# Patient Record
Sex: FEMALE | Race: WHITE | Employment: UNEMPLOYED | URBAN - METROPOLITAN AREA
[De-identification: names, ages, dates, MRNs, and addresses within clinical notes are randomized per-mention and may not be internally consistent; named-entity substitution may affect disease eponyms.]

---

## 2021-02-07 ENCOUNTER — HOSPITAL ENCOUNTER (EMERGENCY)
Age: 46
Discharge: HOME | End: 2021-02-07
Attending: EMERGENCY MEDICINE

## 2021-02-07 VITALS
DIASTOLIC BLOOD PRESSURE: 78 MMHG | HEIGHT: 65 IN | HEART RATE: 103 BPM | BODY MASS INDEX: 36.65 KG/M2 | WEIGHT: 220 LBS | SYSTOLIC BLOOD PRESSURE: 123 MMHG | TEMPERATURE: 98.7 F | RESPIRATION RATE: 18 BRPM | OXYGEN SATURATION: 96 %

## 2021-02-07 DIAGNOSIS — E87.6 HYPOKALEMIA: ICD-10-CM

## 2021-02-07 DIAGNOSIS — F15.10 METHAMPHETAMINE ABUSE (HCC): Primary | ICD-10-CM

## 2021-02-07 LAB
ALBUMIN SERPL-MCNC: 3.1 G/DL (ref 3.5–5)
ALBUMIN/GLOB SERPL: 0.7 {RATIO} (ref 1.2–3.5)
ALP SERPL-CCNC: 67 U/L (ref 50–136)
ALT SERPL-CCNC: 135 U/L (ref 12–65)
AMPHET UR QL SCN: POSITIVE
ANION GAP SERPL CALC-SCNC: 10 MMOL/L (ref 7–16)
APPEARANCE UR: ABNORMAL
AST SERPL-CCNC: 170 U/L (ref 15–37)
BACTERIA URNS QL MICRO: ABNORMAL /HPF
BARBITURATES UR QL SCN: NEGATIVE
BASOPHILS # BLD: 0 K/UL (ref 0–0.2)
BASOPHILS NFR BLD: 0 % (ref 0–2)
BENZODIAZ UR QL: NEGATIVE
BILIRUB SERPL-MCNC: 0.7 MG/DL (ref 0.2–1.1)
BILIRUB UR QL: ABNORMAL
BUN SERPL-MCNC: 17 MG/DL (ref 6–23)
CALCIUM SERPL-MCNC: 8.3 MG/DL (ref 8.3–10.4)
CANNABINOIDS UR QL SCN: POSITIVE
CASTS URNS QL MICRO: ABNORMAL /LPF
CHLORIDE SERPL-SCNC: 99 MMOL/L (ref 98–107)
CO2 SERPL-SCNC: 28 MMOL/L (ref 21–32)
COCAINE UR QL SCN: NEGATIVE
COLOR UR: ABNORMAL
CREAT SERPL-MCNC: 0.85 MG/DL (ref 0.6–1)
DIFFERENTIAL METHOD BLD: ABNORMAL
EOSINOPHIL # BLD: 0.1 K/UL (ref 0–0.8)
EOSINOPHIL NFR BLD: 1 % (ref 0.5–7.8)
EPI CELLS #/AREA URNS HPF: ABNORMAL /HPF
ERYTHROCYTE [DISTWIDTH] IN BLOOD BY AUTOMATED COUNT: 12.7 % (ref 11.9–14.6)
GLOBULIN SER CALC-MCNC: 4.2 G/DL (ref 2.3–3.5)
GLUCOSE SERPL-MCNC: 135 MG/DL (ref 65–100)
GLUCOSE UR STRIP.AUTO-MCNC: NEGATIVE MG/DL
HCG UR QL: NEGATIVE
HCT VFR BLD AUTO: 34.8 % (ref 35.8–46.3)
HGB BLD-MCNC: 12.2 G/DL (ref 11.7–15.4)
HGB UR QL STRIP: ABNORMAL
IMM GRANULOCYTES # BLD AUTO: 0 K/UL (ref 0–0.5)
IMM GRANULOCYTES NFR BLD AUTO: 0 % (ref 0–5)
KETONES UR QL STRIP.AUTO: ABNORMAL MG/DL
LACTATE SERPL-SCNC: 2 MMOL/L (ref 0.4–2)
LEUKOCYTE ESTERASE UR QL STRIP.AUTO: ABNORMAL
LYMPHOCYTES # BLD: 0.7 K/UL (ref 0.5–4.6)
LYMPHOCYTES NFR BLD: 7 % (ref 13–44)
MCH RBC QN AUTO: 30.3 PG (ref 26.1–32.9)
MCHC RBC AUTO-ENTMCNC: 35.1 G/DL (ref 31.4–35)
MCV RBC AUTO: 86.4 FL (ref 79.6–97.8)
METHADONE UR QL: NEGATIVE
MONOCYTES # BLD: 0.8 K/UL (ref 0.1–1.3)
MONOCYTES NFR BLD: 8 % (ref 4–12)
MUCOUS THREADS URNS QL MICRO: ABNORMAL /LPF
NEUTS SEG # BLD: 8.2 K/UL (ref 1.7–8.2)
NEUTS SEG NFR BLD: 84 % (ref 43–78)
NITRITE UR QL STRIP.AUTO: NEGATIVE
NRBC # BLD: 0 K/UL (ref 0–0.2)
OPIATES UR QL: NEGATIVE
OTHER OBSERVATIONS,UCOM: ABNORMAL
PCP UR QL: NEGATIVE
PH UR STRIP: 6 [PH] (ref 5–9)
PLATELET # BLD AUTO: 172 K/UL (ref 150–450)
PMV BLD AUTO: 10.6 FL (ref 9.4–12.3)
POTASSIUM SERPL-SCNC: 2.7 MMOL/L (ref 3.5–5.1)
PROT SERPL-MCNC: 7.3 G/DL (ref 6.3–8.2)
PROT UR STRIP-MCNC: 30 MG/DL
RBC # BLD AUTO: 4.03 M/UL (ref 4.05–5.2)
RBC #/AREA URNS HPF: ABNORMAL /HPF
SODIUM SERPL-SCNC: 137 MMOL/L (ref 136–145)
SP GR UR REFRACTOMETRY: 1.03 (ref 1–1.02)
UROBILINOGEN UR QL STRIP.AUTO: 1 EU/DL (ref 0.2–1)
WBC # BLD AUTO: 9.8 K/UL (ref 4.3–11.1)
WBC URNS QL MICRO: ABNORMAL /HPF

## 2021-02-07 PROCEDURE — 81001 URINALYSIS AUTO W/SCOPE: CPT

## 2021-02-07 PROCEDURE — 99285 EMERGENCY DEPT VISIT HI MDM: CPT

## 2021-02-07 PROCEDURE — 74011000250 HC RX REV CODE- 250: Performed by: EMERGENCY MEDICINE

## 2021-02-07 PROCEDURE — 83605 ASSAY OF LACTIC ACID: CPT

## 2021-02-07 PROCEDURE — 80307 DRUG TEST PRSMV CHEM ANLYZR: CPT

## 2021-02-07 PROCEDURE — 74011250637 HC RX REV CODE- 250/637: Performed by: EMERGENCY MEDICINE

## 2021-02-07 PROCEDURE — 85025 COMPLETE CBC W/AUTO DIFF WBC: CPT

## 2021-02-07 PROCEDURE — 81025 URINE PREGNANCY TEST: CPT

## 2021-02-07 PROCEDURE — 80053 COMPREHEN METABOLIC PANEL: CPT

## 2021-02-07 RX ORDER — POTASSIUM CHLORIDE 20 MEQ/1
40 TABLET, EXTENDED RELEASE ORAL
Status: COMPLETED | OUTPATIENT
Start: 2021-02-07 | End: 2021-02-07

## 2021-02-07 RX ADMIN — POTASSIUM CHLORIDE 40 MEQ: 20 TABLET, EXTENDED RELEASE ORAL at 06:37

## 2021-02-07 RX ADMIN — BACITRACIN, NEOMYCIN, POLYMYXIN B 1 PACKET: 400; 3.5; 5 OINTMENT TOPICAL at 04:41
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
## 2021-02-07 NOTE — ED NOTES
I have reviewed discharge instructions with the patient. The patient verbalized understanding. Patient left ED via Discharge Method: ambulatory to Home with self with resources from [facility]  [facility]. Given Eveline duran to [facility] of pts request.     Opportunity for questions and clarification provided. Patient given 0 scripts. To continue your aftercare when you leave the hospital, you may receive an automated call from our care team to check in on how you are doing. This is a free service and part of our promise to provide the best care and service to meet your aftercare needs.  If you have questions, or wish to unsubscribe from this service please call [phone]. Thank you for Choosing our [employer] Emergency Department.

## 2021-02-07 NOTE — ED NOTES
Pt I no acute distress at this time. Ambulated to and from restroom with no difficulties. Awaiting Social work and [facility] was called and a message left.
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
## 2021-02-07 NOTE — ED PROVIDER NOTES
75-year-old female presenting for evaluation of substance abuse issues. Reports that she started using methamphetamines again and needs assistance establishing some stability so she can get off the streets get away from these things. She was just released from skilled nursing on Monday but has nowhere to stay. She also wants to know where these lesions on her skin are coming from. She does admit that she injects drugs. She denies smoking drugs or snorting drugs. She only uses methamphetamines. She tells me part of her reason for using them again was to [facility] awake\" because while she was out on the street she knew she could not trust anyone. The history is provided by the patient. Skin Problem   This is a new problem. The current episode started more than 1 week ago. The problem has not changed since onset. Associated with: IV drug abuse. There has been no fever. The rash is present on the scalp, head, face, lips, right hand and left arm. The pain is at a severity of 3/10. The pain is mild. The pain has been constant since onset. Associated symptoms include blisters, pain and weeping. She has tried nothing for the symptoms. The treatment provided no relief. Past Medical History:   Diagnosis Date    Other ill-defined conditions(459.25)     sciatica; high cholesterol       Past Surgical History:   Procedure Laterality Date    HX  SECTION      x 4    HX CHOLECYSTECTOMY           No family history on file.     Social History     Socioeconomic History    Marital status:      Spouse name: Not on file    Number of children: Not on file    Years of education: Not on file    Highest education level: Not on file   Occupational History    Not on file   Social Needs    Financial resource strain: Not on file    Food insecurity     Worry: Not on file     Inability: Not on file    Transportation needs     Medical: Not on file     Non-medical: Not on file   Tobacco Use    Smoking status: Current Every Day Smoker     Packs/day: 1.00   Substance and Sexual Activity    Alcohol use: Yes     Comment: socially    Drug use: No    Sexual activity: Yes     Partners: Male   Lifestyle    Physical activity     Days per week: Not on file     Minutes per session: Not on file    Stress: Not on file   Relationships    Social connections     Talks on phone: Not on file     Gets together: Not on file     Attends Yazidism service: Not on file     Active member of club or organization: Not on file     Attends meetings of clubs or organizations: Not on file     Relationship status: Not on file    Intimate partner violence     Fear of current or ex partner: Not on file     Emotionally abused: Not on file     Physically abused: Not on file     Forced sexual activity: Not on file   Other Topics Concern    Not on file   Social History Narrative    Not on file         ALLERGIES: Erythromycin    Review of Systems   Constitutional: Positive for activity change. Skin: Positive for wound. Psychiatric/Behavioral: Positive for agitation, behavioral problems and sleep disturbance. The patient is nervous/anxious and is hyperactive. All other systems reviewed and are negative. Vitals:    02/07/21 0424   BP: (!) 172/127   Pulse: (!) 106   Resp: 18   Temp: 98.7 °F (37.1 °C)   SpO2: 100%   Weight: 99.8 kg (220 lb)   Height: 5' 5\" (1.651 m)            Physical Exam  Vitals signs and nursing note reviewed. Constitutional:       Appearance: She is well-developed. HENT:      Head: Normocephalic and atraumatic. Nose: Nose normal.      Mouth/Throat:      Comments: Diffuse, scaly, thickened and irritated appearing surface of the lips oral mucosa and buccal mucosa are intact and normal.  Eyes:      Conjunctiva/sclera: Conjunctivae normal.      Pupils: Pupils are equal, round, and reactive to light. Neck:      Musculoskeletal: Neck supple. Cardiovascular:      Rate and Rhythm: Normal rate and regular rhythm. Pulmonary:      Effort: Pulmonary effort is normal.      Breath sounds: Normal breath sounds. Abdominal:      General: Bowel sounds are normal.      Palpations: Abdomen is soft. Musculoskeletal: Normal range of motion. Skin:     General: Skin is warm and dry. Comments: Multiple track marks along both forearms, multiple small subcutaneous blisters and pustules on bilateral hands, left forearm, ruptured blisters on the bottoms of both feet   Neurological:      Mental Status: She is alert and oriented to person, place, and time. MDM  Number of Diagnoses or Management Options  Diagnosis management comments: 35-year-old female presenting for evaluation of skin lesions and concerns about her resumption of methamphetamine abuse.        Amount and/or Complexity of Data Reviewed  Clinical lab tests: ordered and reviewed (Results for orders placed or performed during the hospital encounter of 02/07/21  -CBC WITH AUTOMATED DIFF       Result                      Value             Ref Range           WBC                         9.8               4.3 - 11.1 K*       RBC                         4.03 (L)          4.05 - 5.2 M*       HGB                         12.2              11.7 - 15.4 *       HCT                         34.8 (L)          35.8 - 46.3 %       MCV                         86.4              79.6 - 97.8 *       MCH                         30.3              26.1 - 32.9 *       MCHC                        35.1 (H)          31.4 - 35.0 *       RDW                         12.7              11.9 - 14.6 %       PLATELET                    172               150 - 450 K/*       MPV                         10.6              9.4 - 12.3 FL       ABSOLUTE NRBC               0.00              0.0 - 0.2 K/*       DF                          AUTOMATED                             NEUTROPHILS                 84 (H)            43 - 78 %           LYMPHOCYTES                 7 (L)             13 - 44 % MONOCYTES                   8                 4.0 - 12.0 %        EOSINOPHILS                 1                 0.5 - 7.8 %         BASOPHILS                   0                 0.0 - 2.0 %         IMMATURE GRANULOCYTES       0                 0.0 - 5.0 %         ABS. NEUTROPHILS            8.2               1.7 - 8.2 K/*       ABS. LYMPHOCYTES            0.7               0.5 - 4.6 K/*       ABS. MONOCYTES              0.8               0.1 - 1.3 K/*       ABS. EOSINOPHILS            0.1               0.0 - 0.8 K/*       ABS. BASOPHILS              0.0               0.0 - 0.2 K/*       ABS. IMM. GRANS.            0.0               0.0 - 0.5 K/*  -METABOLIC PANEL, COMPREHENSIVE       Result                      Value             Ref Range           Sodium                      137               136 - 145 mm*       Potassium                   2.7 (L)           3.5 - 5.1 mm*       Chloride                    99                98 - 107 mmo*       CO2                         28                21 - 32 mmol*       Anion gap                   10                7 - 16 mmol/L       Glucose                     135 (H)           65 - 100 mg/*       BUN                         17                6 - 23 MG/DL        Creatinine                  0.85              0.6 - 1.0 MG*       GFR est AA                  >60               >60 ml/min/1*       GFR est non-AA              >60               >60 ml/min/1*       Calcium                     8.3               8.3 - 10.4 M*       Bilirubin, total            0.7               0.2 - 1.1 MG*       ALT (SGPT)                  135 (H)           12 - 65 U/L         AST (SGOT)                  170 (H)           15 - 37 U/L         Alk.  phosphatase            67                50 - 136 U/L        Protein, total              7.3               6.3 - 8.2 g/*       Albumin                     3.1 (L)           3.5 - 5.0 g/*       Globulin                    4.2 (H)           2.3 - 3.5 g/* A-G Ratio                   0.7 (L)           1.2 - 3.5      -LACTIC ACID       Result                      Value             Ref Range           Lactic acid                 2.0               0.4 - 2.0 MM*  )  Decide to obtain previous medical records or to obtain history from someone other than the patient: yes    Risk of Complications, Morbidity, and/or Mortality  Presenting problems: high  Diagnostic procedures: moderate  Management options: moderate  General comments: Patient is remained hemodynamically stable. Potassium is low and repleted orally. Applied antibiotic ointment to the patient's chapped and cracked lips. Other wounds on hands and feet do not appear to be infected will likely improve if the patient stops using IV drugs. Placed a consult order for social work evaluation.     Patient Progress  Patient progress: stable         Procedures

## 2021-02-08 ENCOUNTER — APPOINTMENT (OUTPATIENT)
Dept: CT IMAGING | Age: 46
DRG: 871 | End: 2021-02-08
Attending: EMERGENCY MEDICINE

## 2021-02-08 ENCOUNTER — HOSPITAL ENCOUNTER (INPATIENT)
Age: 46
LOS: 7 days | Discharge: HOME | DRG: 871 | End: 2021-02-15
Attending: EMERGENCY MEDICINE | Admitting: HOSPITALIST

## 2021-02-08 DIAGNOSIS — L03.90 CELLULITIS, UNSPECIFIED CELLULITIS SITE: ICD-10-CM

## 2021-02-08 DIAGNOSIS — N30.00 ACUTE CYSTITIS WITHOUT HEMATURIA: ICD-10-CM

## 2021-02-08 DIAGNOSIS — L03.818 CELLULITIS OF OTHER SPECIFIED SITE: ICD-10-CM

## 2021-02-08 DIAGNOSIS — F15.10 METHAMPHETAMINE ABUSE (HCC): Primary | ICD-10-CM

## 2021-02-08 DIAGNOSIS — T14.8XXA SKIN ABRASION: ICD-10-CM

## 2021-02-08 PROBLEM — A41.9 SEPSIS (HCC): Status: ACTIVE | Noted: 2021-02-08

## 2021-02-08 PROBLEM — R41.82 ALTERED MENTAL STATUS: Status: ACTIVE | Noted: 2021-02-08

## 2021-02-08 LAB
ALBUMIN SERPL-MCNC: 3.3 G/DL (ref 3.5–5)
ALBUMIN/GLOB SERPL: 0.8 {RATIO} (ref 1.2–3.5)
ALP SERPL-CCNC: 76 U/L (ref 50–136)
ALT SERPL-CCNC: 117 U/L (ref 12–65)
ANION GAP SERPL CALC-SCNC: 8 MMOL/L (ref 7–16)
AST SERPL-CCNC: 116 U/L (ref 15–37)
BASOPHILS # BLD: 0 K/UL (ref 0–0.2)
BASOPHILS NFR BLD: 0 % (ref 0–2)
BILIRUB SERPL-MCNC: 1.1 MG/DL (ref 0.2–1.1)
BUN SERPL-MCNC: 15 MG/DL (ref 6–23)
CALCIUM SERPL-MCNC: 8 MG/DL (ref 8.3–10.4)
CHLORIDE SERPL-SCNC: 92 MMOL/L (ref 98–107)
CO2 SERPL-SCNC: 29 MMOL/L (ref 21–32)
CREAT SERPL-MCNC: 0.73 MG/DL (ref 0.6–1)
CRP SERPL-MCNC: 17.8 MG/DL (ref 0–0.9)
DIFFERENTIAL METHOD BLD: ABNORMAL
EOSINOPHIL # BLD: 0.1 K/UL (ref 0–0.8)
EOSINOPHIL NFR BLD: 1 % (ref 0.5–7.8)
ERYTHROCYTE [DISTWIDTH] IN BLOOD BY AUTOMATED COUNT: 12.7 % (ref 11.9–14.6)
GLOBULIN SER CALC-MCNC: 3.9 G/DL (ref 2.3–3.5)
GLUCOSE SERPL-MCNC: 106 MG/DL (ref 65–100)
HCT VFR BLD AUTO: 33.6 % (ref 35.8–46.3)
HGB BLD-MCNC: 11.8 G/DL (ref 11.7–15.4)
IMM GRANULOCYTES # BLD AUTO: 0.1 K/UL (ref 0–0.5)
IMM GRANULOCYTES NFR BLD AUTO: 1 % (ref 0–5)
LACTATE SERPL-SCNC: 1.7 MMOL/L (ref 0.4–2)
LYMPHOCYTES # BLD: 0.8 K/UL (ref 0.5–4.6)
LYMPHOCYTES NFR BLD: 8 % (ref 13–44)
MAGNESIUM SERPL-MCNC: 2 MG/DL (ref 1.8–2.4)
MCH RBC QN AUTO: 29.8 PG (ref 26.1–32.9)
MCHC RBC AUTO-ENTMCNC: 35.1 G/DL (ref 31.4–35)
MCV RBC AUTO: 84.8 FL (ref 79.6–97.8)
MONOCYTES # BLD: 0.9 K/UL (ref 0.1–1.3)
MONOCYTES NFR BLD: 8 % (ref 4–12)
NEUTS SEG # BLD: 9.3 K/UL (ref 1.7–8.2)
NEUTS SEG NFR BLD: 84 % (ref 43–78)
NRBC # BLD: 0 K/UL (ref 0–0.2)
PLATELET # BLD AUTO: 224 K/UL (ref 150–450)
PMV BLD AUTO: 10.6 FL (ref 9.4–12.3)
POTASSIUM SERPL-SCNC: 2.7 MMOL/L (ref 3.5–5.1)
PROCALCITONIN SERPL-MCNC: 0.25 NG/ML
PROT SERPL-MCNC: 7.2 G/DL (ref 6.3–8.2)
RBC # BLD AUTO: 3.96 M/UL (ref 4.05–5.2)
SODIUM SERPL-SCNC: 129 MMOL/L (ref 136–145)
WBC # BLD AUTO: 11.1 K/UL (ref 4.3–11.1)

## 2021-02-08 PROCEDURE — 74011250636 HC RX REV CODE- 250/636: Performed by: EMERGENCY MEDICINE

## 2021-02-08 PROCEDURE — 96365 THER/PROPH/DIAG IV INF INIT: CPT

## 2021-02-08 PROCEDURE — 87077 CULTURE AEROBIC IDENTIFY: CPT

## 2021-02-08 PROCEDURE — 83735 ASSAY OF MAGNESIUM: CPT

## 2021-02-08 PROCEDURE — 70450 CT HEAD/BRAIN W/O DYE: CPT

## 2021-02-08 PROCEDURE — 36415 COLL VENOUS BLD VENIPUNCTURE: CPT

## 2021-02-08 PROCEDURE — 96375 TX/PRO/DX INJ NEW DRUG ADDON: CPT

## 2021-02-08 PROCEDURE — 85025 COMPLETE CBC W/AUTO DIFF WBC: CPT

## 2021-02-08 PROCEDURE — 80053 COMPREHEN METABOLIC PANEL: CPT

## 2021-02-08 PROCEDURE — 80074 ACUTE HEPATITIS PANEL: CPT

## 2021-02-08 PROCEDURE — 87389 HIV-1 AG W/HIV-1&-2 AB AG IA: CPT

## 2021-02-08 PROCEDURE — 96367 TX/PROPH/DG ADDL SEQ IV INF: CPT

## 2021-02-08 PROCEDURE — 87150 DNA/RNA AMPLIFIED PROBE: CPT

## 2021-02-08 PROCEDURE — 84145 PROCALCITONIN (PCT): CPT

## 2021-02-08 PROCEDURE — 87186 SC STD MICRODIL/AGAR DIL: CPT

## 2021-02-08 PROCEDURE — 99284 EMERGENCY DEPT VISIT MOD MDM: CPT

## 2021-02-08 PROCEDURE — 83605 ASSAY OF LACTIC ACID: CPT

## 2021-02-08 PROCEDURE — 74011250637 HC RX REV CODE- 250/637: Performed by: EMERGENCY MEDICINE

## 2021-02-08 PROCEDURE — 86140 C-REACTIVE PROTEIN: CPT

## 2021-02-08 PROCEDURE — 65270000029 HC RM PRIVATE

## 2021-02-08 PROCEDURE — 87040 BLOOD CULTURE FOR BACTERIA: CPT

## 2021-02-08 PROCEDURE — 74011000258 HC RX REV CODE- 258: Performed by: EMERGENCY MEDICINE

## 2021-02-08 PROCEDURE — 87205 SMEAR GRAM STAIN: CPT

## 2021-02-08 PROCEDURE — 96361 HYDRATE IV INFUSION ADD-ON: CPT

## 2021-02-08 PROCEDURE — 74011250636 HC RX REV CODE- 250/636: Performed by: HOSPITALIST

## 2021-02-08 RX ORDER — POTASSIUM CHLORIDE 14.9 MG/ML
20 INJECTION INTRAVENOUS
Status: COMPLETED | OUTPATIENT
Start: 2021-02-08 | End: 2021-02-09

## 2021-02-08 RX ORDER — ACETAMINOPHEN 650 MG/1
650 SUPPOSITORY RECTAL
Status: DISCONTINUED | OUTPATIENT
Start: 2021-02-08 | End: 2021-02-15 | Stop reason: HOSPADM

## 2021-02-08 RX ORDER — CEPHALEXIN 500 MG/1
500 CAPSULE ORAL 2 TIMES DAILY
Qty: 14 CAP | Refills: 0 | Status: SHIPPED | OUTPATIENT
Start: 2021-02-08 | End: 2021-02-14

## 2021-02-08 RX ORDER — POTASSIUM CHLORIDE 20 MEQ/1
40 TABLET, EXTENDED RELEASE ORAL
Status: ACTIVE | OUTPATIENT
Start: 2021-02-08 | End: 2021-02-09

## 2021-02-08 RX ORDER — ONDANSETRON 2 MG/ML
4 INJECTION INTRAMUSCULAR; INTRAVENOUS
Status: DISCONTINUED | OUTPATIENT
Start: 2021-02-08 | End: 2021-02-15 | Stop reason: HOSPADM

## 2021-02-08 RX ORDER — LORAZEPAM 2 MG/ML
2 INJECTION INTRAMUSCULAR
Status: COMPLETED | OUTPATIENT
Start: 2021-02-08 | End: 2021-02-08

## 2021-02-08 RX ORDER — CLINDAMYCIN PHOSPHATE 900 MG/50ML
900 INJECTION INTRAVENOUS
Status: COMPLETED | OUTPATIENT
Start: 2021-02-08 | End: 2021-02-08

## 2021-02-08 RX ORDER — POLYETHYLENE GLYCOL 3350 17 G/17G
17 POWDER, FOR SOLUTION ORAL DAILY PRN
Status: DISCONTINUED | OUTPATIENT
Start: 2021-02-08 | End: 2021-02-15 | Stop reason: HOSPADM

## 2021-02-08 RX ORDER — SODIUM CHLORIDE 0.9 % (FLUSH) 0.9 %
5-40 SYRINGE (ML) INJECTION EVERY 8 HOURS
Status: DISCONTINUED | OUTPATIENT
Start: 2021-02-09 | End: 2021-02-15 | Stop reason: HOSPADM

## 2021-02-08 RX ORDER — HALOPERIDOL 5 MG/ML
4 INJECTION INTRAMUSCULAR ONCE
Status: COMPLETED | OUTPATIENT
Start: 2021-02-08 | End: 2021-02-08

## 2021-02-08 RX ORDER — SODIUM CHLORIDE 0.9 % (FLUSH) 0.9 %
5-40 SYRINGE (ML) INJECTION AS NEEDED
Status: DISCONTINUED | OUTPATIENT
Start: 2021-02-08 | End: 2021-02-15 | Stop reason: HOSPADM

## 2021-02-08 RX ORDER — ENOXAPARIN SODIUM 100 MG/ML
40 INJECTION SUBCUTANEOUS DAILY
Status: DISCONTINUED | OUTPATIENT
Start: 2021-02-09 | End: 2021-02-15 | Stop reason: HOSPADM

## 2021-02-08 RX ORDER — PROMETHAZINE HYDROCHLORIDE 25 MG/1
12.5 TABLET ORAL
Status: DISCONTINUED | OUTPATIENT
Start: 2021-02-08 | End: 2021-02-15 | Stop reason: HOSPADM

## 2021-02-08 RX ORDER — ACETAMINOPHEN 500 MG
1000 TABLET ORAL
Status: COMPLETED | OUTPATIENT
Start: 2021-02-08 | End: 2021-02-08

## 2021-02-08 RX ORDER — ACETAMINOPHEN 325 MG/1
650 TABLET ORAL
Status: DISCONTINUED | OUTPATIENT
Start: 2021-02-08 | End: 2021-02-15 | Stop reason: HOSPADM

## 2021-02-08 RX ORDER — ZOLPIDEM TARTRATE 5 MG/1
5 TABLET ORAL
Status: DISCONTINUED | OUTPATIENT
Start: 2021-02-08 | End: 2021-02-15 | Stop reason: HOSPADM

## 2021-02-08 RX ADMIN — SODIUM CHLORIDE 1000 ML: 900 INJECTION, SOLUTION INTRAVENOUS at 14:46

## 2021-02-08 RX ADMIN — CLINDAMYCIN IN 5 PERCENT DEXTROSE 900 MG: 18 INJECTION, SOLUTION INTRAVENOUS at 18:52

## 2021-02-08 RX ADMIN — CEFTRIAXONE SODIUM 1 G: 1 INJECTION, POWDER, FOR SOLUTION INTRAMUSCULAR; INTRAVENOUS at 15:51

## 2021-02-08 RX ADMIN — THIAMINE HYDROCHLORIDE: 100 INJECTION, SOLUTION INTRAMUSCULAR; INTRAVENOUS at 20:12

## 2021-02-08 RX ADMIN — HALOPERIDOL LACTATE 4 MG: 5 INJECTION, SOLUTION INTRAMUSCULAR at 17:00

## 2021-02-08 RX ADMIN — ACETAMINOPHEN 1000 MG: 500 TABLET ORAL at 14:46

## 2021-02-08 RX ADMIN — POTASSIUM CHLORIDE 20 MEQ: 14.9 INJECTION, SOLUTION INTRAVENOUS at 18:56

## 2021-02-08 RX ADMIN — LORAZEPAM 2 MG: 2 INJECTION INTRAMUSCULAR; INTRAVENOUS at 14:46

## 2021-02-08 NOTE — ED TRIAGE NOTES
[place] [facility] Pt has wounds all over her in various stages. Pt reports using Meth.  /73 Temp 101.5

## 2021-02-08 NOTE — ED PROVIDER NOTES
Patient presents to the ER via EMS for \"wounds all over her body\". Apparently patient was found [place] the gas station, acting altered, admitted to methamphetamine use. Reports she has wounds all over her body. The history is provided by the patient. Skin Problem   This is a new problem. The current episode started more than 2 days ago. The problem has not changed since onset. There has been a fever of 101 - 101.9 F. The patient is experiencing no pain. Associated symptoms include weeping. Past Medical History:   Diagnosis Date    Other ill-defined conditions(819.89)     sciatica; high cholesterol       Past Surgical History:   Procedure Laterality Date    HX  SECTION      x 4    HX CHOLECYSTECTOMY           No family history on file.     Social History     Socioeconomic History    Marital status:      Spouse name: Not on file    Number of children: Not on file    Years of education: Not on file    Highest education level: Not on file   Occupational History    Not on file   Social Needs    Financial resource strain: Not on file    Food insecurity     Worry: Not on file     Inability: Not on file    Transportation needs     Medical: Not on file     Non-medical: Not on file   Tobacco Use    Smoking status: Current Every Day Smoker     Packs/day: 1.00   Substance and Sexual Activity    Alcohol use: Yes     Comment: socially    Drug use: No    Sexual activity: Yes     Partners: Male   Lifestyle    Physical activity     Days per week: Not on file     Minutes per session: Not on file    Stress: Not on file   Relationships    Social connections     Talks on phone: Not on file     Gets together: Not on file     Attends Sabianism service: Not on file     Active member of club or organization: Not on file     Attends meetings of clubs or organizations: Not on file     Relationship status: Not on file    Intimate partner violence     Fear of current or ex partner: Not on file Emotionally abused: Not on file     Physically abused: Not on file     Forced sexual activity: Not on file   Other Topics Concern    Not on file   Social History Narrative    Not on file         ALLERGIES: Erythromycin    Review of Systems   Constitutional: Positive for fatigue and fever. HENT: Negative for congestion and dental problem. Eyes: Negative for redness and visual disturbance. Respiratory: Negative for choking, chest tightness, shortness of breath and stridor. Cardiovascular: Negative for palpitations and leg swelling. Gastrointestinal: Negative for abdominal pain, nausea and vomiting. Genitourinary: Negative for hematuria and urgency. Musculoskeletal: Negative for back pain, gait problem and neck stiffness. Skin: Negative for color change and pallor. Neurological: Negative for tremors, weakness, light-headedness and numbness. Hematological: Negative for adenopathy. Psychiatric/Behavioral: Negative for behavioral problems and confusion. All other systems reviewed and are negative. Vitals:    02/08/21 1248   BP: 128/70   Pulse: 73   Resp: 16   Temp: (!) 100.6 °F (38.1 °C)   SpO2: 95%   Weight: 99.8 kg (220 lb)   Height: 5' 5\" (1.651 m)            Physical Exam  Vitals signs reviewed. Constitutional:       Appearance: Normal appearance. She is ill-appearing. HENT:      Head: Normocephalic and atraumatic. Nose: Nose normal. No congestion or rhinorrhea. Mouth/Throat:      Mouth: Mucous membranes are moist.   Eyes:      General:         Right eye: No discharge. Left eye: No discharge. Extraocular Movements: Extraocular movements intact. Pupils: Pupils are equal, round, and reactive to light. Neck:      Musculoskeletal: Normal range of motion and neck supple. Cardiovascular:      Rate and Rhythm: Normal rate and regular rhythm. Pulses: Normal pulses. Heart sounds: Normal heart sounds. No murmur.    Pulmonary:      Effort: Pulmonary effort is normal. No respiratory distress. Breath sounds: Normal breath sounds. Abdominal:      General: Abdomen is flat. Palpations: Abdomen is soft. Musculoskeletal: Normal range of motion. General: No swelling or deformity. Skin:     Comments: Multiple skin wounds noted diffusely on patient's body, and multiple stages of healing. Neurological:      Mental Status: She is alert. Psychiatric:         Mood and Affect: Mood is anxious. Behavior: Behavior is agitated. MDM  Number of Diagnoses or Management Options  Acute cystitis without hematuria  Methamphetamine abuse ([facility] [address]  Diagnosis management comments: Patient actually was seen here yesterday, diagnosed with a UTI. She is febrile here. Has multiple skin wounds. Appears to be under influence of methamphetamine as well.    3:41 PM  White count is 11.1. Chemistry panel is only significant for potassium of 2.7. Normal lactic acid. Pro-Mariano is 0.2     Patient urinalysis yesterday was consistent with UTI. Will start Rocephin here. 4:32 PM  Patient still somewhat agitated. Flailing around in bed. Will treat with Haldol.        Amount and/or Complexity of Data Reviewed  Clinical lab tests: ordered and reviewed    Risk of Complications, Morbidity, and/or Mortality  Presenting problems: moderate  Diagnostic procedures: low  Management options: moderate    Patient Progress  Patient progress: stable         Procedures        Results Include:    Recent Results (from the past 24 hour(s))   CBC WITH AUTOMATED DIFF    Collection Time: 02/08/21 12:52 PM   Result Value Ref Range    WBC 11.1 4.3 - 11.1 K/uL    RBC 3.96 (L) 4.05 - 5.2 M/uL    HGB 11.8 11.7 - 15.4 g/dL    HCT 33.6 (L) 35.8 - 46.3 %    MCV 84.8 79.6 - 97.8 FL    MCH 29.8 26.1 - 32.9 PG    MCHC 35.1 (H) 31.4 - 35.0 g/dL    RDW 12.7 11.9 - 14.6 %    PLATELET 897 603 - 419 K/uL    MPV 10.6 9.4 - 12.3 FL    ABSOLUTE NRBC 0.00 0.0 - 0.2 K/uL    DF AUTOMATED NEUTROPHILS 84 (H) 43 - 78 %    LYMPHOCYTES 8 (L) 13 - 44 %    MONOCYTES 8 4.0 - 12.0 %    EOSINOPHILS 1 0.5 - 7.8 %    BASOPHILS 0 0.0 - 2.0 %    IMMATURE GRANULOCYTES 1 0.0 - 5.0 %    ABS. NEUTROPHILS 9.3 (H) 1.7 - 8.2 K/UL    ABS. LYMPHOCYTES 0.8 0.5 - 4.6 K/UL    ABS. MONOCYTES 0.9 0.1 - 1.3 K/UL    ABS. EOSINOPHILS 0.1 0.0 - 0.8 K/UL    ABS. BASOPHILS 0.0 0.0 - 0.2 K/UL    ABS. IMM. GRANS. 0.1 0.0 - 0.5 K/UL   METABOLIC PANEL, COMPREHENSIVE    Collection Time: 02/08/21 12:52 PM   Result Value Ref Range    Sodium 129 (L) 136 - 145 mmol/L    Potassium 2.7 (L) 3.5 - 5.1 mmol/L    Chloride 92 (L) 98 - 107 mmol/L    CO2 29 21 - 32 mmol/L    Anion gap 8 7 - 16 mmol/L    Glucose 106 (H) 65 - 100 mg/dL    BUN 15 6 - 23 MG/DL    Creatinine 0.73 0.6 - 1.0 MG/DL    GFR est AA >60 >60 ml/min/1.73m2    GFR est non-AA >60 >60 ml/min/1.73m2    Calcium 8.0 (L) 8.3 - 10.4 MG/DL    Bilirubin, total 1.1 0.2 - 1.1 MG/DL    ALT (SGPT) 117 (H) 12 - 65 U/L    AST (SGOT) 116 (H) 15 - 37 U/L    Alk. phosphatase 76 50 - 136 U/L    Protein, total 7.2 6.3 - 8.2 g/dL    Albumin 3.3 (L) 3.5 - 5.0 g/dL    Globulin 3.9 (H) 2.3 - 3.5 g/dL    A-G Ratio 0.8 (L) 1.2 - 3.5     LACTIC ACID    Collection Time: 02/08/21 12:52 PM   Result Value Ref Range    Lactic acid 1.7 0.4 - 2.0 MMOL/L   CULTURE, BLOOD    Collection Time: 02/08/21 12:52 PM    Specimen: Blood   Result Value Ref Range    Special Requests: LEFT  HAND        Culture result: PENDING    PROCALCITONIN    Collection Time: 02/08/21 12:52 PM   Result Value Ref Range    Procalcitonin 0.25 ng/mL   MAGNESIUM    Collection Time: 02/08/21 12:52 PM   Result Value Ref Range    Magnesium 2.0 1.8 - 2.4 mg/dL     Voice dictation software was used during the making of this note. This software is not perfect and grammatical and other typographical errors may be present. This note has been proofread, but may still contain errors.   Dominick Castellon MD; 2/8/2021 @3:42 PM ===================================================================

## 2021-02-09 ENCOUNTER — APPOINTMENT (OUTPATIENT)
Dept: ULTRASOUND IMAGING | Age: 46
DRG: 871 | End: 2021-02-09
Attending: HOSPITALIST

## 2021-02-09 PROBLEM — E87.6 HYPOKALEMIA: Status: ACTIVE | Noted: 2021-02-09

## 2021-02-09 PROBLEM — E87.1 HYPONATREMIA: Status: ACTIVE | Noted: 2021-02-09

## 2021-02-09 PROBLEM — G93.41 ACUTE METABOLIC ENCEPHALOPATHY: Status: ACTIVE | Noted: 2021-02-09

## 2021-02-09 PROBLEM — R76.8 HEPATITIS C ANTIBODY TEST POSITIVE: Status: ACTIVE | Noted: 2021-02-09

## 2021-02-09 LAB
ACC. NO. FROM MICRO ORDER, ACCP: ABNORMAL
ALBUMIN SERPL-MCNC: 2.3 G/DL (ref 3.5–5)
ALBUMIN/GLOB SERPL: 0.6 {RATIO} (ref 1.2–3.5)
ALP SERPL-CCNC: 64 U/L (ref 50–136)
ALT SERPL-CCNC: 80 U/L (ref 12–65)
ANION GAP SERPL CALC-SCNC: 6 MMOL/L (ref 7–16)
AST SERPL-CCNC: 72 U/L (ref 15–37)
BASOPHILS # BLD: 0 K/UL (ref 0–0.2)
BASOPHILS NFR BLD: 0 % (ref 0–2)
BILIRUB SERPL-MCNC: 0.8 MG/DL (ref 0.2–1.1)
BUN SERPL-MCNC: 4 MG/DL (ref 6–23)
CALCIUM SERPL-MCNC: 7.8 MG/DL (ref 8.3–10.4)
CHLORIDE SERPL-SCNC: 99 MMOL/L (ref 98–107)
CO2 SERPL-SCNC: 33 MMOL/L (ref 21–32)
CREAT SERPL-MCNC: 0.47 MG/DL (ref 0.6–1)
DIFFERENTIAL METHOD BLD: ABNORMAL
EOSINOPHIL # BLD: 0 K/UL (ref 0–0.8)
EOSINOPHIL NFR BLD: 0 % (ref 0.5–7.8)
ERYTHROCYTE [DISTWIDTH] IN BLOOD BY AUTOMATED COUNT: 13.1 % (ref 11.9–14.6)
GLOBULIN SER CALC-MCNC: 4 G/DL (ref 2.3–3.5)
GLUCOSE SERPL-MCNC: 85 MG/DL (ref 65–100)
HAV IGM SER QL: NONREACTIVE
HBV CORE IGM SER QL: NONREACTIVE
HBV SURFACE AG SER QL: NONREACTIVE
HCT VFR BLD AUTO: 32.2 % (ref 35.8–46.3)
HCV AB SER QL: REACTIVE
HGB BLD-MCNC: 11 G/DL (ref 11.7–15.4)
HIV 1+2 AB+HIV1 P24 AG SERPL QL IA: NONREACTIVE
HIV12 RESULT COMMENT, HHIVC: ABNORMAL
IMM GRANULOCYTES # BLD AUTO: 0 K/UL (ref 0–0.5)
IMM GRANULOCYTES NFR BLD AUTO: 1 % (ref 0–5)
INTERPRETATION: ABNORMAL
LYMPHOCYTES # BLD: 0.9 K/UL (ref 0.5–4.6)
LYMPHOCYTES NFR BLD: 14 % (ref 13–44)
MAGNESIUM SERPL-MCNC: 2.7 MG/DL (ref 1.8–2.4)
MCH RBC QN AUTO: 30.3 PG (ref 26.1–32.9)
MCHC RBC AUTO-ENTMCNC: 34.2 G/DL (ref 31.4–35)
MCV RBC AUTO: 88.7 FL (ref 79.6–97.8)
MONOCYTES # BLD: 0.9 K/UL (ref 0.1–1.3)
MONOCYTES NFR BLD: 13 % (ref 4–12)
NEUTS SEG # BLD: 4.9 K/UL (ref 1.7–8.2)
NEUTS SEG NFR BLD: 72 % (ref 43–78)
NRBC # BLD: 0 K/UL (ref 0–0.2)
PLATELET # BLD AUTO: 193 K/UL (ref 150–450)
PMV BLD AUTO: 11 FL (ref 9.4–12.3)
POTASSIUM SERPL-SCNC: 2.2 MMOL/L (ref 3.5–5.1)
PROT SERPL-MCNC: 6.3 G/DL (ref 6.3–8.2)
RBC # BLD AUTO: 3.63 M/UL (ref 4.05–5.2)
SODIUM SERPL-SCNC: 138 MMOL/L (ref 136–145)
STREPTOCOCCUS , STPSP: DETECTED
STREPTOCOCCUS PYOGENES (GROUP A), SPYOP: DETECTED
WBC # BLD AUTO: 6.8 K/UL (ref 4.3–11.1)

## 2021-02-09 PROCEDURE — 85025 COMPLETE CBC W/AUTO DIFF WBC: CPT

## 2021-02-09 PROCEDURE — 83735 ASSAY OF MAGNESIUM: CPT

## 2021-02-09 PROCEDURE — 80053 COMPREHEN METABOLIC PANEL: CPT

## 2021-02-09 PROCEDURE — 74011000258 HC RX REV CODE- 258: Performed by: HOSPITALIST

## 2021-02-09 PROCEDURE — 74011250636 HC RX REV CODE- 250/636: Performed by: HOSPITALIST

## 2021-02-09 PROCEDURE — 74011250637 HC RX REV CODE- 250/637: Performed by: INTERNAL MEDICINE

## 2021-02-09 PROCEDURE — 74011250637 HC RX REV CODE- 250/637: Performed by: HOSPITALIST

## 2021-02-09 PROCEDURE — 76705 ECHO EXAM OF ABDOMEN: CPT

## 2021-02-09 PROCEDURE — 65270000029 HC RM PRIVATE

## 2021-02-09 PROCEDURE — 74011250636 HC RX REV CODE- 250/636: Performed by: INTERNAL MEDICINE

## 2021-02-09 PROCEDURE — 36415 COLL VENOUS BLD VENIPUNCTURE: CPT

## 2021-02-09 PROCEDURE — 86580 TB INTRADERMAL TEST: CPT | Performed by: INTERNAL MEDICINE

## 2021-02-09 PROCEDURE — 74011000302 HC RX REV CODE- 302: Performed by: INTERNAL MEDICINE

## 2021-02-09 RX ORDER — POTASSIUM CHLORIDE 20 MEQ/1
40 TABLET, EXTENDED RELEASE ORAL 2 TIMES DAILY
Status: COMPLETED | OUTPATIENT
Start: 2021-02-09 | End: 2021-02-11

## 2021-02-09 RX ORDER — NYSTATIN 100000 [USP'U]/G
POWDER TOPICAL 3 TIMES DAILY
Status: DISCONTINUED | OUTPATIENT
Start: 2021-02-09 | End: 2021-02-15 | Stop reason: HOSPADM

## 2021-02-09 RX ORDER — VANCOMYCIN 1.75 GRAM/500 ML IN 0.9 % SODIUM CHLORIDE INTRAVENOUS
1750 EVERY 12 HOURS
Status: DISCONTINUED | OUTPATIENT
Start: 2021-02-09 | End: 2021-02-10

## 2021-02-09 RX ORDER — VANCOMYCIN/0.9 % SOD CHLORIDE 1.5G/250ML
1500 PLASTIC BAG, INJECTION (ML) INTRAVENOUS EVERY 12 HOURS
Status: DISCONTINUED | OUTPATIENT
Start: 2021-02-09 | End: 2021-02-09

## 2021-02-09 RX ADMIN — VANCOMYCIN HYDROCHLORIDE 1750 MG: 10 INJECTION, POWDER, LYOPHILIZED, FOR SOLUTION INTRAVENOUS at 23:58

## 2021-02-09 RX ADMIN — SODIUM CHLORIDE 3 G: 900 INJECTION INTRAVENOUS at 11:57

## 2021-02-09 RX ADMIN — SODIUM CHLORIDE 3 G: 900 INJECTION INTRAVENOUS at 23:58

## 2021-02-09 RX ADMIN — Medication 10 ML: at 14:58

## 2021-02-09 RX ADMIN — SODIUM CHLORIDE 3 G: 900 INJECTION INTRAVENOUS at 05:30

## 2021-02-09 RX ADMIN — VANCOMYCIN HYDROCHLORIDE 1750 MG: 10 INJECTION, POWDER, LYOPHILIZED, FOR SOLUTION INTRAVENOUS at 11:57

## 2021-02-09 RX ADMIN — VANCOMYCIN HYDROCHLORIDE 2500 MG: 10 INJECTION, POWDER, LYOPHILIZED, FOR SOLUTION INTRAVENOUS at 00:00

## 2021-02-09 RX ADMIN — ZOLPIDEM TARTRATE 5 MG: 5 TABLET, COATED ORAL at 21:52

## 2021-02-09 RX ADMIN — POTASSIUM CHLORIDE 40 MEQ: 20 TABLET, EXTENDED RELEASE ORAL at 18:08

## 2021-02-09 RX ADMIN — ACETAMINOPHEN 650 MG: 325 TABLET ORAL at 21:58

## 2021-02-09 RX ADMIN — NYSTATIN: 100000 POWDER TOPICAL at 11:57

## 2021-02-09 RX ADMIN — ENOXAPARIN SODIUM 40 MG: 100 INJECTION SUBCUTANEOUS at 08:12

## 2021-02-09 RX ADMIN — POTASSIUM CHLORIDE 40 MEQ: 20 TABLET, EXTENDED RELEASE ORAL at 11:57

## 2021-02-09 RX ADMIN — NYSTATIN: 100000 POWDER TOPICAL at 21:53

## 2021-02-09 RX ADMIN — Medication 10 ML: at 00:00

## 2021-02-09 RX ADMIN — SODIUM CHLORIDE 3 G: 900 INJECTION INTRAVENOUS at 18:08

## 2021-02-09 RX ADMIN — Medication 10 ML: at 05:30

## 2021-02-09 RX ADMIN — TUBERCULIN PURIFIED PROTEIN DERIVATIVE 5 UNITS: 5 INJECTION, SOLUTION INTRADERMAL at 15:29

## 2021-02-09 RX ADMIN — NYSTATIN: 100000 POWDER TOPICAL at 18:09

## 2021-02-09 RX ADMIN — Medication 10 ML: at 21:52

## 2021-02-09 NOTE — PROGRESS NOTES
Pharmacokinetic Consult to Pharmacist    Timoteo Maldonado is a 55 y.o. female being treated  with vancomycin. Height: 5' 5\" (165.1 cm)  Weight: 99.8 kg (220 lb)  Lab Results   Component Value Date/Time    BUN 15 02/08/2021 12:52 PM    Creatinine 0.73 02/08/2021 12:52 PM    WBC 11.1 02/08/2021 12:52 PM    Procalcitonin 0.25 02/08/2021 12:52 PM    Lactic acid 1.7 02/08/2021 12:52 PM    Lactic Acid (POC) 1.5 08/04/2016 12:07 PM      Estimated Creatinine Clearance: 112.6 mL/min (based on SCr of 0.73 mg/dL). Day 1 of vancomycin. Goal trough is 15-20. Vancomycin dose initiated at 2500 mg x 1 dose; followed by Vanc 1750 mg IV q12h (sepsis/polysubstance/SSTI). Will continue to follow patient and order levels when clinically indicated.     Thank you,  Brenden Brito, PharmD    [facility]  [email or web address]

## 2021-02-09 NOTE — PROGRESS NOTES
Pharmacokinetic Consult to Pharmacist    Timoteo Maldonado is a 55 y.o. female being treated  with vancomycin. Height: 5' 5\" (165.1 cm)  Weight: 99.8 kg (220 lb)  Lab Results   Component Value Date/Time    BUN 15 02/08/2021 12:52 PM    Creatinine 0.73 02/08/2021 12:52 PM    WBC 11.1 02/08/2021 12:52 PM    Procalcitonin 0.25 02/08/2021 12:52 PM    Lactic acid 1.7 02/08/2021 12:52 PM    Lactic Acid (POC) 1.5 08/04/2016 12:07 PM      Estimated Creatinine Clearance: 112.6 mL/min (based on SCr of 0.73 mg/dL). Day 1 of vancomycin. Goal trough is 15-20. Vancomycin dose initiated at 2500 mg x 1 dose; followed by Vanc 1500 mg IV q12h. Will continue to follow patient and order levels when clinically indicated. Thank you,  Nancy Lauren, PharmD.

## 2021-02-09 NOTE — PROGRESS NOTES
Met with patient in room - ppe + social distance maintained. Spoke with patient regarding her current situation and d/c planning. Patient stated that she is homeless - she stated the last place she was living was long term. She confirmed she does not have insurance and does not have a pcp. She confirmed her emergency contact is Ivon Raymond - she stated that is her aunt - she stated she can call her but cannot live with her. She stated her current address on her facesheet is a [employer] that assists people with no address - she stated it is not a place that she can live. Patient stated that she would be willing to go to a shelter @ d/c. Patient has blood cultures that are pending - d/c plan will depend on if she needs outpatient iv abx. PT/OT consulted to assess mobility. CM will continue to follow for d/c planning. Care Management Interventions  PCP Verified by CM: No  Mode of Transport at Discharge: Self  Transition of Care Consult (CM Consult): Discharge Planning  Discharge Durable Medical Equipment: No  Physical Therapy Consult: Yes  Occupational Therapy Consult: Yes  Speech Therapy Consult: No  Current Support Network:  Other  Confirm Follow Up Transport: Self  The Patient and/or Patient Representative was Provided with a Choice of Provider and Agrees with the Discharge Plan?: Yes  Freedom of Choice List was Provided with Basic Dialogue that Supports the Patient's Individualized Plan of Care/Goals, Treatment Preferences and Shares the Quality Data Associated with the Providers?: Yes  Thor Resource Information Provided?: No  Discharge Location  Discharge Placement: Unable to determine at this time
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
## 2021-02-10 PROBLEM — B95.5 BACTEREMIA DUE TO STREPTOCOCCUS: Status: ACTIVE | Noted: 2021-02-10

## 2021-02-10 PROBLEM — R78.81 BACTEREMIA DUE TO STREPTOCOCCUS: Status: ACTIVE | Noted: 2021-02-10

## 2021-02-10 LAB
ANION GAP SERPL CALC-SCNC: 4 MMOL/L (ref 7–16)
BNP SERPL-MCNC: 710 PG/ML (ref 5–125)
BUN SERPL-MCNC: 5 MG/DL (ref 6–23)
CALCIUM SERPL-MCNC: 8.2 MG/DL (ref 8.3–10.4)
CHLORIDE SERPL-SCNC: 107 MMOL/L (ref 98–107)
CO2 SERPL-SCNC: 33 MMOL/L (ref 21–32)
CREAT SERPL-MCNC: 0.58 MG/DL (ref 0.6–1)
D DIMER PPP FEU-MCNC: 4.62 UG/ML(FEU)
ERYTHROCYTE [DISTWIDTH] IN BLOOD BY AUTOMATED COUNT: 13.5 % (ref 11.9–14.6)
GLUCOSE SERPL-MCNC: 122 MG/DL (ref 65–100)
HCT VFR BLD AUTO: 33.6 % (ref 35.8–46.3)
HGB BLD-MCNC: 11.1 G/DL (ref 11.7–15.4)
MCH RBC QN AUTO: 29.8 PG (ref 26.1–32.9)
MCHC RBC AUTO-ENTMCNC: 33 G/DL (ref 31.4–35)
MCV RBC AUTO: 90.1 FL (ref 79.6–97.8)
NRBC # BLD: 0 K/UL (ref 0–0.2)
PLATELET # BLD AUTO: 203 K/UL (ref 150–450)
PMV BLD AUTO: 10.4 FL (ref 9.4–12.3)
POTASSIUM SERPL-SCNC: 2.8 MMOL/L (ref 3.5–5.1)
RBC # BLD AUTO: 3.73 M/UL (ref 4.05–5.2)
SODIUM SERPL-SCNC: 144 MMOL/L (ref 136–145)
WBC # BLD AUTO: 4.8 K/UL (ref 4.3–11.1)

## 2021-02-10 PROCEDURE — 74011000258 HC RX REV CODE- 258: Performed by: HOSPITALIST

## 2021-02-10 PROCEDURE — 80048 BASIC METABOLIC PNL TOTAL CA: CPT

## 2021-02-10 PROCEDURE — 85379 FIBRIN DEGRADATION QUANT: CPT

## 2021-02-10 PROCEDURE — 65270000029 HC RM PRIVATE

## 2021-02-10 PROCEDURE — 74011250637 HC RX REV CODE- 250/637: Performed by: HOSPITALIST

## 2021-02-10 PROCEDURE — 97161 PT EVAL LOW COMPLEX 20 MIN: CPT

## 2021-02-10 PROCEDURE — 83880 ASSAY OF NATRIURETIC PEPTIDE: CPT

## 2021-02-10 PROCEDURE — 97530 THERAPEUTIC ACTIVITIES: CPT

## 2021-02-10 PROCEDURE — 74011250636 HC RX REV CODE- 250/636: Performed by: INTERNAL MEDICINE

## 2021-02-10 PROCEDURE — 85027 COMPLETE CBC AUTOMATED: CPT

## 2021-02-10 PROCEDURE — 36415 COLL VENOUS BLD VENIPUNCTURE: CPT

## 2021-02-10 PROCEDURE — 87902 NFCT AGT GNTYP ALYS HEP C: CPT

## 2021-02-10 PROCEDURE — 74011250637 HC RX REV CODE- 250/637: Performed by: INTERNAL MEDICINE

## 2021-02-10 PROCEDURE — 74011250636 HC RX REV CODE- 250/636: Performed by: HOSPITALIST

## 2021-02-10 PROCEDURE — 87522 HEPATITIS C REVRS TRNSCRPJ: CPT

## 2021-02-10 RX ORDER — HYDROMORPHONE HYDROCHLORIDE 1 MG/ML
0.5 INJECTION, SOLUTION INTRAMUSCULAR; INTRAVENOUS; SUBCUTANEOUS
Status: DISCONTINUED | OUTPATIENT
Start: 2021-02-10 | End: 2021-02-15 | Stop reason: HOSPADM

## 2021-02-10 RX ORDER — TRAMADOL HYDROCHLORIDE 50 MG/1
50 TABLET ORAL
Status: DISCONTINUED | OUTPATIENT
Start: 2021-02-10 | End: 2021-02-15 | Stop reason: HOSPADM

## 2021-02-10 RX ORDER — CLINDAMYCIN PHOSPHATE 900 MG/50ML
900 INJECTION INTRAVENOUS EVERY 8 HOURS
Status: DISCONTINUED | OUTPATIENT
Start: 2021-02-10 | End: 2021-02-11

## 2021-02-10 RX ADMIN — POTASSIUM CHLORIDE 40 MEQ: 20 TABLET, EXTENDED RELEASE ORAL at 08:54

## 2021-02-10 RX ADMIN — NYSTATIN: 100000 POWDER TOPICAL at 21:08

## 2021-02-10 RX ADMIN — TRAMADOL HYDROCHLORIDE 50 MG: 50 TABLET, FILM COATED ORAL at 17:29

## 2021-02-10 RX ADMIN — Medication 10 ML: at 05:47

## 2021-02-10 RX ADMIN — ENOXAPARIN SODIUM 40 MG: 100 INJECTION SUBCUTANEOUS at 08:54

## 2021-02-10 RX ADMIN — NYSTATIN: 100000 POWDER TOPICAL at 08:54

## 2021-02-10 RX ADMIN — SODIUM CHLORIDE 3 G: 900 INJECTION INTRAVENOUS at 05:47

## 2021-02-10 RX ADMIN — VANCOMYCIN HYDROCHLORIDE 1750 MG: 10 INJECTION, POWDER, LYOPHILIZED, FOR SOLUTION INTRAVENOUS at 15:51

## 2021-02-10 RX ADMIN — ACETAMINOPHEN 650 MG: 325 TABLET ORAL at 08:58

## 2021-02-10 RX ADMIN — ZOLPIDEM TARTRATE 5 MG: 5 TABLET, COATED ORAL at 21:09

## 2021-02-10 RX ADMIN — Medication 10 ML: at 13:20

## 2021-02-10 RX ADMIN — TRAMADOL HYDROCHLORIDE 50 MG: 50 TABLET, FILM COATED ORAL at 11:32

## 2021-02-10 RX ADMIN — POTASSIUM CHLORIDE 40 MEQ: 20 TABLET, EXTENDED RELEASE ORAL at 17:25

## 2021-02-10 RX ADMIN — CLINDAMYCIN IN 5 PERCENT DEXTROSE 900 MG: 18 INJECTION, SOLUTION INTRAVENOUS at 18:32

## 2021-02-10 RX ADMIN — SODIUM CHLORIDE 3 G: 900 INJECTION INTRAVENOUS at 14:53

## 2021-02-10 RX ADMIN — NYSTATIN: 100000 POWDER TOPICAL at 15:56

## 2021-02-10 NOTE — PROGRESS NOTES
ACUTE PHYSICAL THERAPY GOALS:  (Developed with and agreed upon by patient and/or caregiver. )  LTG:  (1.)Ms. Domenica Gooden will move from supine to sit and sit to supine, scoot up and down and roll side to side in bed INDEPENDENTLY with bed flat within 7 treatment day(s). (2.)Ms. Domenica Gooden will transfer from bed to chair and chair to bed INDEPENDENTLY within 7 treatment day(s). (3.)Ms. Domenica Gooden will ambulate INDEPENDENTLY for 500 feet with the least restrictive device within 7 treatment day(s). (4.)Ms. Domenica Gooden will ascend and descend 10 steps with SUPERVISION using handrail(s) within 7 days. ________________________________________________________________________________________________    PHYSICAL THERAPY ASSESSMENT: Initial Assessment and AM PT Treatment Day # 1      Giorgio Pat is a 55 y.o. female   PRIMARY DIAGNOSIS: <principal problem not specified>  Sepsis ([facility] [A41.9]  Altered mental status [R41.82]  Amphetamine use disorder, mild ([facility] [F15.10]       Reason for Referral:  Weakness, unsteady gait  ICD-10: Treatment Diagnosis: Generalized Muscle Weakness (M62.81)  Difficulty in walking, Not elsewhere classified (R26.2)  Other abnormalities of gait and mobility (R26.89)  INPATIENT: Payor: /     ASSESSMENT:     REHAB RECOMMENDATIONS:   Recommendation to date pending progress:  Setting:   No further skilled therapy   Equipment:    To Be Determined     PRIOR LEVEL OF FUNCTION:  (Prior to Hospitalization) INITIAL/CURRENT LEVEL OF FUNCTION:  (Most Recently Demonstrated)   Bed Mobility:   Independent  Sit to Stand:   Independent  Transfers:   Independent  Gait/Mobility:   Independent Bed Mobility:   Independent  Sit to Stand:   Supervision  Transfers:   Supervision  Gait/Mobility:  [facility] [employer] Assistance     ASSESSMENT:  Ms. Domenica Gooden presents with sepsis, altered mental status, polysubstance abuse. She is agreeable to therapy assessment and mobility today.  Presents with generalized weakness, chronic back sciatica pain. Moves well however, transfers to sitting independently and demonstrates intact seated balance. Pain in upper and lower extremities due to several skin lesions. Mobility in room and hallway with SBA for safety and mildly antalgic pattern due to back pain. No loss of balance or assistance needed. Returned to room and up to chair, asking to take shower. Daughter present. RN notified of request and will help with wrapping IV. Pt currently functioning slightly below baseline due to pain, decreased activity tolerance. Anticipate she will progress well and no dc needs identified. SUBJECTIVE:   Ms. Laya Barahona states, \"my back is hurting. \"    SOCIAL HISTORY/LIVING ENVIRONMENT: Homeless. Polysubstance abuse.       OBJECTIVE:     PAIN: VITAL SIGNS: LINES/DRAINS:   Pre Treatment: Pain Screen  Pain Scale 1: Numeric (0 - 10)  Pain Intensity 1: 3  Pain Location 1: Back  Pain Intervention(s) 1: Ambulation/Increased Activity;Repositioned  Post Treatment: 2/10 Vital Signs  O2 Device: Room air none  O2 Device: Room air     GROSS EVALUATION:   Within Functional Limits Abnormal/ Functional Abnormal/ Non-Functional (see comments) Not Tested Comments:   AROM [x] [] [] []    PROM [] [] [] []    Strength [x] [] [] []    Balance [] [x] [] []    Posture [x] [] [] []    Sensation [] [] [] []    Coordination [x] [] [] []    Tone [] [] [] []    Edema [] [] [] []    Activity Tolerance [] [x] [] []     [] [] [] []      COGNITION/  PERCEPTION: Intact Impaired   (see comments) Comments:   Orientation [x] []    Vision [x] []    Hearing [x] []    Command Following [x] []    Safety Awareness [x] []     [] []      MOBILITY: I Mod I S SBA CGA Min Mod Max Total  NT x2 Comments:   Bed Mobility    Rolling [x] [] [] [] [] [] [] [] [] [] []    Supine to Sit [x] [] [] [] [] [] [] [] [] [] []    Scooting [x] [] [] [] [] [] [] [] [] [] []    Sit to Supine [x] [] [] [] [] [] [] [] [] [] []    Transfers    Sit to Stand [] [] [x] [] [] [] [] [] [] [] []    Bed to Chair [] [] [x] [] [] [] [] [] [] [] []    Stand to Sit [] [] [x] [] [] [] [] [] [] [] []    I=Independent, Mod I=Modified Independent, S=Supervision, SBA=Standby Assistance, CGA=Contact Guard Assistance,   Min=Minimal Assistance, Mod=Moderate Assistance, Max=Maximal Assistance, Total=Total Assistance, NT=Not Tested  GAIT: I Mod I S SBA CGA Min Mod Max Total  NT x2 Comments:   Level of Assistance [] [] [] [x] [] [] [] [] [] [] []    Distance 180    DME None    Gait Quality Mild trunk sway, antalgic pattern d/t sciatic pain    Weightbearing Status N/A     I=Independent, Mod I=Modified Independent, S=Supervision, SBA=Standby Assistance, CGA=Contact Guard Assistance,   Min=Minimal Assistance, Mod=Moderate Assistance, Max=Maximal Assistance, Total=Total Assistance, NT=Not [facility]       How much difficulty does the patient currently have. .. Unable A Lot A Little None   1. Turning over in bed (including adjusting bedclothes, sheets and blankets)? [] 1   [] 2   [] 3   [x] 4   2. Sitting down on and standing up from a chair with arms ( e.g., wheelchair, bedside commode, etc.)   [] 1   [] 2   [] 3   [x] 4   3. Moving from lying on back to sitting on the side of the bed? [] 1   [] 2   [] 3   [x] 4   How much help from another person does the patient currently need. .. Total A Lot A Little None   4. Moving to and from a bed to a chair (including a wheelchair)? [] 1   [] 2   [x] 3   [] 4   5. Need to walk in hospital room? [] 1   [] 2   [x] 3   [] 4   6. Climbing 3-5 steps with a railing? [] 1   [] 2   [x] 3   [] 4   © 2007, Trustees of [address] [place] [address] [address] [id number], under license to [employer]. All rights reserved     Score:  Initial: 21 Most Recent: X (Date: -- )    Interpretation of Tool:  Represents activities that are increasingly more difficult (i.e. Bed mobility, Transfers, Gait).     PLAN:   FREQUENCY/DURATION: PT Plan of Care: 3 times/week for duration of hospital stay or until stated goals are met, whichever comes first.    PROBLEM LIST:   (Skilled intervention is medically necessary to address:)  1. Decreased Activity Tolerance  2. Decreased Balance  3. Decreased Gait Ability  4. Increased Pain   INTERVENTIONS PLANNED:   (Benefits and precautions of physical therapy have been discussed with the patient.)  1. Therapeutic Activity  2. Therapeutic Exercise/HEP  3. Neuromuscular Re-education  4. Gait Training  5. Manual Therapy  6. Education     TREATMENT:     EVALUATION: Low Complexity : (Untimed Charge)    TREATMENT:   ($$ Therapeutic Activity: 8-22 mins    )  Therapeutic Activity (9 Minutes): Therapeutic activity included Rolling, Supine to Sit, Scooting, Transfer Training, Ambulation on level ground, Sitting balance  and Standing balance to improve functional Mobility, Strength, Activity tolerance and balance.     AFTER TREATMENT POSITION/PRECAUTIONS:  Chair, Needs within reach and RN notified    INTERDISCIPLINARY COLLABORATION:  RN/PCT and PT/PTA    TOTAL TREATMENT DURATION:  PT Patient Time In/Time Out  Time In: 1015  Time Out: [address], DPT

## 2021-02-10 NOTE — PROGRESS NOTES
Problem: Falls - Risk of  Goal: *Absence of Falls  Description: Document Tosha Myers Fall Risk and appropriate interventions in the flowsheet. Outcome: Progressing Towards Goal  Note: Fall Risk Interventions:  Mobility Interventions: Bed/chair exit alarm, OT consult for ADLs, PT Consult for mobility concerns    Mentation Interventions: Adequate sleep, hydration, pain control, Bed/chair exit alarm    Medication Interventions: Bed/chair exit alarm, Patient to call before getting OOB, Teach patient to arise slowly    Elimination Interventions: Call light in reach, Bed/chair exit alarm              Problem: Patient Education: Go to Patient Education Activity  Goal: Patient/Family Education  Outcome: Progressing Towards Goal     Problem: Pressure Injury - Risk of  Goal: *Prevention of pressure injury  Description: Document Jim Scale and appropriate interventions in the flowsheet.   Outcome: Progressing Towards Goal  Note: Pressure Injury Interventions:  Sensory Interventions: Assess changes in LOC    Moisture Interventions: Minimize layers    Activity Interventions: Pressure redistribution bed/mattress(bed type)    Mobility Interventions: Float heels, PT/OT evaluation, Assess need for specialty bed    Nutrition Interventions: Document food/fluid/supplement intake    Friction and Shear Interventions: Minimize layers, Lift sheet                Problem: Patient Education: Go to Patient Education Activity  Goal: Patient/Family Education  Outcome: Progressing Towards Goal     Problem: Patient Education: Go to Patient Education Activity  Goal: Patient/Family Education  Outcome: Progressing Towards Goal

## 2021-02-10 NOTE — PROGRESS NOTES
Hospitalist Progress Note       Name: Angelina Mas MRN: [id number]  : 1975  Age:46 y.o.  female  Admit Date:  2021 LOS: 2    Reason for Admission:  Sepsis ([facility] [address] [A41.9]  Altered mental status [R41.82]  Amphetamine use disorder, mild ([facility] [address] [F15.10]    Hospital Course:  Please refer to the admission H&P for details of presentation. In summary, Angelina Mas is a 55 y.o. homeless female with medical history significant  For polysubstance abuse(methamphetamine, cannabis) who presented via EMS wanting to detox from methamphetamine. She was noted to have multiple skin wounds all over her body in various stages. Noted to have temp of 101.5F, tachycardia of 115, Na 129, K 2.7, elevated LFTs, e;evated CRP. UA noted to be 3+ bacteria with 10-20 WBCs. Subjective/24 hr Events (02/10/21) :  Patient is seen and examined at bedside. No acute events reported overnight by nursing staff. Reports feeling tired as she wasn't able to rest mcuch overnight in the ED. Patient denies fever, chills, chest pains, shortness of breath, n/v, abdominal pain. ROS: 10 point review of systems is otherwise negative with the exception of the elements mentioned above. Objective:    Patient Vitals for the past 24 hrs:   Temp Pulse Resp BP SpO2   02/10/21 1542 97.7 °F (36.5 °C) 73 18 104/63 99 %   02/10/21 1202 98.6 °F (37 °C) 84 18 101/62 98 %   02/10/21 0800 98.2 °F (36.8 °C) 82 18 (!) 95/57 99 %   02/10/21 0400 98.4 °F (36.9 °C) 97 18 118/64 93 %     Oxygen Therapy  O2 Sat (%): 99 % (02/10/21 1542)  Pulse via Oximetry: 98 beats per minute (21 1840)  O2 Device: Room air (02/10/21 1542)    Body mass index is 36.61 kg/m². Physical Exam:   General:     alert, awake, no acute distress. Appears tired  Head:   normocephalic, atraumatic  Eyes, Ears, nose: PERRL, EOMI. Normal conjunctiva  Neck:    supple, non-tender. Trachea midline.    Lungs:   CTAB, no wheezing, rhonchi, rales  Cardiac:   RRR, Normal S1 and S2.  Abdomen:   Soft, non distended, nontender, +BS,  Extremities:   Warm, dry. B/l LE skin cellulitis with diffuse erythema, multiple skin wounds  Skin:   No rashes, no jaundice, multiple skin wounds through out t  Neuro:  AAOx3.  No gross focal neurological deficit  Psychiatric:  No anxiety, calm, cooperative    Data Review:  I have reviewed all labs, meds, and studies from the last 24 hours:      Labs:    Recent Results (from the past 24 hour(s))   CBC W/O DIFF    Collection Time: 02/10/21  9:24 AM   Result Value Ref Range    WBC 4.8 4.3 - 11.1 K/uL    RBC 3.73 (L) 4.05 - 5.2 M/uL    HGB 11.1 (L) 11.7 - 15.4 g/dL    HCT 33.6 (L) 35.8 - 46.3 %    MCV 90.1 79.6 - 97.8 FL    MCH 29.8 26.1 - 32.9 PG    MCHC 33.0 31.4 - 35.0 g/dL    RDW 13.5 11.9 - 14.6 %    PLATELET 694 216 - 332 K/uL    MPV 10.4 9.4 - 12.3 FL    ABSOLUTE NRBC 0.00 0.0 - 0.2 K/uL   METABOLIC PANEL, BASIC    Collection Time: 02/10/21  9:24 AM   Result Value Ref Range    Sodium 144 136 - 145 mmol/L    Potassium 2.8 (L) 3.5 - 5.1 mmol/L    Chloride 107 98 - 107 mmol/L    CO2 33 (H) 21 - 32 mmol/L    Anion gap 4 (L) 7 - 16 mmol/L    Glucose 122 (H) 65 - 100 mg/dL    BUN 5 (L) 6 - 23 MG/DL    Creatinine 0.58 (L) 0.6 - 1.0 MG/DL    GFR est AA >60 >60 ml/min/1.73m2    GFR est non-AA >60 >60 ml/min/1.73m2    Calcium 8.2 (L) 8.3 - 10.4 MG/DL         All Micro Results     Procedure Component Value Units Date/Time    CULTURE, BLOOD [[id number]]  (Abnormal) Collected: 02/08/21 1252    Order Status: Completed Specimen: Blood Updated: 02/10/21 0449     Special Requests: --        LEFT  HAND       GRAM STAIN GRAM POS COCCI IN CHAINS         ANAEROBIC BOTTLE POSITIVE               RESULTS VERIFIED, PHONED TO AND READ BACK BY Sina Farmer RN @4634 ON [id number] [place]           Culture result:       STREPTOCOCCI, BETA HEMOLYTIC GROUP A SENSITIVITY TO FOLLOW Reported to Dept of Health                  Refer to Blood Culture ID Panel Accession  [id number]      BLOOD CULTURE ID PANEL [[id number]]  (Abnormal) Collected: 02/08/21 1252    Order Status: Completed Specimen: Blood Updated: 02/09/21 0648     Acc. no. from Micro Order [id number]     Streptococcus Detected        Streptococcus pyogenes (Group A) Detected        Comment: RESULTS VERIFIED, PHONED TO AND READ BACK BY  Jv Krishnan RN @3680 ON [id number] [place]          INTERPRETATION       Gram Positive cocci, Identified by realtime PCR as  Streptococcus pyogenes (Group A Strep)          CULTURE, BLOOD [[id number]]     Order Status: Canceled Specimen: Blood           Current Meds:  Current Facility-Administered Medications   Medication Dose Route Frequency    traMADoL (ULTRAM) tablet 50 mg  50 mg Oral Q6H PRN    HYDROmorphone (PF) (DILAUDID) injection 0.5 mg  0.5 mg IntraVENous Q6H PRN    Vancomycin Trough Level Reminder   Other ONCE    clindamycin (CLEOCIN) 900mg D5W 50mL IVPB (premix)  900 mg IntraVENous Q8H    nystatin (MYCOSTATIN) 100,000 unit/gram powder   Topical TID    potassium chloride (K-DUR, KLOR-CON) SR tablet 40 mEq  40 mEq Oral BID    sodium chloride (NS) flush 5-40 mL  5-40 mL IntraVENous Q8H    sodium chloride (NS) flush 5-40 mL  5-40 mL IntraVENous PRN    acetaminophen (TYLENOL) tablet 650 mg  650 mg Oral Q6H PRN    Or    acetaminophen (TYLENOL) suppository 650 mg  650 mg Rectal Q6H PRN    polyethylene glycol (MIRALAX) packet 17 g  17 g Oral DAILY PRN    promethazine (PHENERGAN) tablet 12.5 mg  12.5 mg Oral Q6H PRN    Or    ondansetron (ZOFRAN) injection 4 mg  4 mg IntraVENous Q6H PRN    enoxaparin (LOVENOX) injection 40 mg  40 mg SubCUTAneous DAILY    zolpidem (AMBIEN) tablet 5 mg  5 mg Oral QHS PRN         Other Studies:  Ct Head Wo Cont    Result Date: 2/8/2021  CT of the head without contrast. CLINICAL INDICATION: Altered mental status PROCEDURE: Serial thin section axial images are obtained from the cranial vertex through the skull base without the administration of intravenous contrast. Radiation dose reduction techniques were used for this study. Our CT scanners use one or all of the following: Automated exposure control, adjusted of the mA and/or kV according to patient size, iterative reconstruction COMPARISON: Head CT dated 7/27/2016. FINDINGS: Images are degraded by motion. There is no acute intracranial hemorrhage, mass, or mass effect. No abnormal extra-axial fluid collections identified. There is no hydrocephalus. The basilar cisterns are widely patent. The gray-white matter brain parenchymal interface is well-maintained. No skull fracture or aggressive osseous lesion noted. The mastoid air cells and included paranasal sinuses are clear. Examination is limited by motion degradation. No gross acute intracranial abnormality. [address] [place] [address]    Result Date: 2/9/2021  EXAM: Limited abdomen ultrasound. INDICATION: Elevated liver enzymes. COMPARISON: None. TECHNIQUE: Standard protocol limited right upper quadrant abdomen ultrasound. FINDINGS: - Liver: Within normal limits. - Gallbladder: Post cholecystectomy. - Bile ducts: Within normal limits. The common bile duct measures 5 mm. - Pancreas: Within normal limits, although the tail was obscured by bowel gas. - Right kidney: Within normal limits. - Aorta and IVC: Within normal limits. - Portal vein: Within normal limits. - Other: No ascites. Unremarkable study in this postcholecystectomy patient.          Assessment:    Hospital Problems as of 2/10/2021 Date Reviewed: 2/9/2021          Codes Class Noted - Resolved POA    Bacteremia due to Streptococcus ICD-10-CM: R78.81, B95.5  ICD-9-CM: 790.7, 041.00  2/10/2021 - Present Unknown        Acute metabolic encephalopathy MLG-03-HG: G93.41  ICD-9-CM: 348.31  2/9/2021 - Present Unknown        Hypokalemia ICD-10-CM: E87.6  ICD-9-CM: 276.8  2/9/2021 - Present Unknown        Hyponatremia ICD-10-CM: E87.1  ICD-9-CM: 276.1  2/9/2021 - Present Unknown        Hepatitis C antibody test positive ICD-10-CM: R76.8  ICD-9-CM: 795.79  2/9/2021 - Present Unknown        Sepsis ([facility] [address] ICD-10-CM: A41.9  ICD-9-CM: 038.9, 995.91  2/8/2021 - Present Unknown        Amphetamine use disorder, mild (HCC) ICD-10-CM: F15.10  ICD-9-CM: 292.9, 305.70  2/8/2021 - Present Unknown               Plan:    Sepsis secondary to soft tissue and skin infection, possible urinary tract infection  Strep Pyogenes Bacteremia  Present on admission, most likely skin source with multiple skin wounds + B/l LE cellulitis  BCx with Strep Pyogenes pending sensitivity  - echo to rule out vegetations  - switch Abx to clindamycin   - follow up BCx for sensitivites     Acute Metabolic Encephalopathy - resolved  Secondary to toxic metabolic encephalopathy, septic encephalopathy, electrolyte abnormalities dehydration, hyponatremia  CT head neg     Hyponatremia: resolved with IVF  Hypokalemia: replete and monitor       Elevated LFTs (improved)  Hep C Ab positive  Hep C Ab (+), HIV(-). Abd ultrasound unremarkable. No tenderness on exam  Hep C QT PCR       Poly-substance abuse and social issues  Homelessness  - CM/SW consulted    Diet:  DIET REGULAR  DVT PPx: lovenox  Code: Full Code    Dispo: pending clinical course and PT/OT Eval  Estimated Discharge: TBD based on clinical course      Labs/Imaging Reviewed. Plan discussed with staff, patient/family and are in agreement. Part of this note was written by using a voice dictation software and the note has been proof read but may still contain some grammatical/other typographical errors.     Signed By: Shayy Jacobs MD     February 10, 2021

## 2021-02-10 NOTE — PROGRESS NOTES
Hospitalist Progress Note       Name: Uziel Coppola MRN: [id number]  : 1975  Age:46 y.o.  female  Admit Date:  2021 LOS: 1    Reason for Admission:  Sepsis ([facility] [address] [A41.9]  Altered mental status [R41.82]  Amphetamine use disorder, mild ([facility] [address] [F15.10]    Hospital Course:  Please refer to the admission H&P for details of presentation. In summary, Uziel Coppola is a 55 y.o. homeless female with medical history significant  For polysubstance abuse(methamphetamine, cannabis) who presented via EMS wanting to detox from methamphetamine. She was noted to have multiple skin wounds all over her body in various stages. Noted to have temp of 101.5F, tachycardia of 115, Na 129, K 2.7, elevated LFTs, e;evated CRP. UA noted to be 3+ bacteria with 10-20 WBCs. Subjective/24 hr Events (21) :  Patient is seen and examined at bedside. No acute events reported overnight by nursing staff. Reports feeling tired as she wasn't able to rest mcuch overnight in the ED. Patient denies fever, chills, chest pains, shortness of breath, n/v, abdominal pain. ROS: 10 point review of systems is otherwise negative with the exception of the elements mentioned above. Objective:    Patient Vitals for the past 24 hrs:   Temp Pulse Resp BP SpO2   21 1543 98.3 °F (36.8 °C) 89 18 107/62 95 %   21 1250 98.9 °F (37.2 °C) 88 18 124/78 96 %   21 0717 98.4 °F (36.9 °C) 90 18 112/64 93 %   21 0400 98.9 °F (37.2 °C) 98 20 115/78 94 %   21 2200 99.5 °F (37.5 °C) (!) 101 22 101/63 95 %   21 2047 100 °F (37.8 °C)         Oxygen Therapy  O2 Sat (%): 95 % (21 1543)  Pulse via Oximetry: 98 beats per minute (21 1840)  O2 Device: Room air (21 1543)    Body mass index is 36.61 kg/m². Physical Exam:   General:     alert, awake, no acute distress. Appears tired  Head:   normocephalic, atraumatic  Eyes, Ears, nose: PERRL, EOMI.  Normal conjunctiva  Neck:    supple, non-tender. Trachea midline. Lungs:   CTAB, no wheezing, rhonchi, rales  Cardiac:   RRR, Normal S1 and S2. Abdomen:   Soft, non distended, nontender, +BS,  Extremities:   Warm, dry. B/l LE skin cellulitis with diffuse erythema, multiple skin wounds  Skin:   No rashes, no jaundice, multiple skin wounds through out t  Neuro:  AAOx3. No gross focal neurological deficit  Psychiatric:  No anxiety, calm, cooperative    Data Review:  I have reviewed all labs, meds, and studies from the last 24 hours:      Labs:    Recent Results (from the past 24 hour(s))   HEPATITIS PANEL, ACUTE    Collection Time: 02/08/21 10:28 PM   Result Value Ref Range    Hepatitis A, IgM NONREACTIVE NR      Hepatitis B core, IgM NONREACTIVE NR      Hep B Surface Ag NONREACTIVE NR      Hepatitis C virus Ab REACTIVE (A) NR     HIV 1/2 AG/AB, 4TH GENERATION,W RFLX CONFIRM    Collection Time: 02/08/21 10:28 PM   Result Value Ref Range    HIV 1/2 Interpretation NONREACTIVE NR      HIV 1/2 result comment SEE NOTE (A) NR     METABOLIC PANEL, COMPREHENSIVE    Collection Time: 02/09/21  6:35 AM   Result Value Ref Range    Sodium 138 136 - 145 mmol/L    Potassium 2.2 (L) 3.5 - 5.1 mmol/L    Chloride 99 98 - 107 mmol/L    CO2 33 (H) 21 - 32 mmol/L    Anion gap 6 (L) 7 - 16 mmol/L    Glucose 85 65 - 100 mg/dL    BUN 4 (L) 6 - 23 MG/DL    Creatinine 0.47 (L) 0.6 - 1.0 MG/DL    GFR est AA >60 >60 ml/min/1.73m2    GFR est non-AA >60 >60 ml/min/1.73m2    Calcium 7.8 (L) 8.3 - 10.4 MG/DL    Bilirubin, total 0.8 0.2 - 1.1 MG/DL    ALT (SGPT) 80 (H) 12 - 65 U/L    AST (SGOT) 72 (H) 15 - 37 U/L    Alk.  phosphatase 64 50 - 136 U/L    Protein, total 6.3 6.3 - 8.2 g/dL    Albumin 2.3 (L) 3.5 - 5.0 g/dL    Globulin 4.0 (H) 2.3 - 3.5 g/dL    A-G Ratio 0.6 (L) 1.2 - 3.5     MAGNESIUM    Collection Time: 02/09/21  6:35 AM   Result Value Ref Range    Magnesium 2.7 (H) 1.8 - 2.4 mg/dL   CBC WITH AUTOMATED DIFF    Collection Time: 02/09/21  6:35 AM   Result Value Ref Range WBC 6.8 4.3 - 11.1 K/uL    RBC 3.63 (L) 4.05 - 5.2 M/uL    HGB 11.0 (L) 11.7 - 15.4 g/dL    HCT 32.2 (L) 35.8 - 46.3 %    MCV 88.7 79.6 - 97.8 FL    MCH 30.3 26.1 - 32.9 PG    MCHC 34.2 31.4 - 35.0 g/dL    RDW 13.1 11.9 - 14.6 %    PLATELET 949 334 - 285 K/uL    MPV 11.0 9.4 - 12.3 FL    ABSOLUTE NRBC 0.00 0.0 - 0.2 K/uL    DF AUTOMATED      NEUTROPHILS 72 43 - 78 %    LYMPHOCYTES 14 13 - 44 %    MONOCYTES 13 (H) 4.0 - 12.0 %    EOSINOPHILS 0 (L) 0.5 - 7.8 %    BASOPHILS 0 0.0 - 2.0 %    IMMATURE GRANULOCYTES 1 0.0 - 5.0 %    ABS. NEUTROPHILS 4.9 1.7 - 8.2 K/UL    ABS. LYMPHOCYTES 0.9 0.5 - 4.6 K/UL    ABS. MONOCYTES 0.9 0.1 - 1.3 K/UL    ABS. EOSINOPHILS 0.0 0.0 - 0.8 K/UL    ABS. BASOPHILS 0.0 0.0 - 0.2 K/UL    ABS. IMM.  GRANS. 0.0 0.0 - 0.5 K/UL         All Micro Results     Procedure Component Value Units Date/Time    BLOOD CULTURE ID PANEL [[id number]]  (Abnormal) Collected: 02/08/21 1252    Order Status: Completed Specimen: Blood Updated: 02/09/21 0648     Acc. no. from Micro Order [id number]     Streptococcus Detected        Streptococcus pyogenes (Group A) Detected        Comment: RESULTS VERIFIED, PHONED TO AND READ BACK BY  HAYLEY PATINO RN @6893 ON [id number] [place]          INTERPRETATION       Gram Positive cocci, Identified by realtime PCR as  Streptococcus pyogenes (Group A Strep)          CULTURE, BLOOD [[id number]] Collected: 02/08/21 1252    Order Status: Completed Specimen: Blood Updated: 02/09/21 9538     Special Requests: --        LEFT  HAND       GRAM STAIN GRAM POS COCCI IN CHAINS         ANAEROBIC BOTTLE POSITIVE               RESULTS VERIFIED, PHONED TO AND READ BACK BY Jaki [place] RN @4010 ON [id number] QN           Culture result:       CULTURE IN [address] UPDATES TO FOLLOW                  Refer to Blood Culture ID Panel Accession  [id number]      CULTURE, BLOOD [[id number]]     Order Status: Canceled Specimen: Blood           Current Meds:  Current Facility-Administered Medications Medication Dose Route Frequency    vancomycin (VANCOCIN) 1750 mg in  ml infusion  1,750 mg IntraVENous Q12H    nystatin (MYCOSTATIN) 100,000 unit/gram powder   Topical TID    potassium chloride (K-DUR, KLOR-CON) SR tablet 40 mEq  40 mEq Oral BID    tuberculin injection 5 Units  5 Units IntraDERMal ONCE    sodium chloride (NS) flush 5-40 mL  5-40 mL IntraVENous Q8H    sodium chloride (NS) flush 5-40 mL  5-40 mL IntraVENous PRN    acetaminophen (TYLENOL) tablet 650 mg  650 mg Oral Q6H PRN    Or    acetaminophen (TYLENOL) suppository 650 mg  650 mg Rectal Q6H PRN    polyethylene glycol (MIRALAX) packet 17 g  17 g Oral DAILY PRN    promethazine (PHENERGAN) tablet 12.5 mg  12.5 mg Oral Q6H PRN    Or    ondansetron (ZOFRAN) injection 4 mg  4 mg IntraVENous Q6H PRN    enoxaparin (LOVENOX) injection 40 mg  40 mg SubCUTAneous DAILY    zolpidem (AMBIEN) tablet 5 mg  5 mg Oral QHS PRN    ampicillin-sulbactam (UNASYN) 3 g in 0.9% sodium chloride (MBP/ADV) 100 mL MBP  3 g IntraVENous Q6H         Other Studies:  Ct Head Wo Cont    Result Date: 2/8/2021  CT of the head without contrast. CLINICAL INDICATION: Altered mental status PROCEDURE: Serial thin section axial images are obtained from the cranial vertex through the skull base without the administration of intravenous contrast. Radiation dose reduction techniques were used for this study. Our CT scanners use one or all of the following: Automated exposure control, adjusted of the mA and/or kV according to patient size, iterative reconstruction COMPARISON: Head CT dated 7/27/2016. FINDINGS: Images are degraded by motion. There is no acute intracranial hemorrhage, mass, or mass effect. No abnormal extra-axial fluid collections identified. There is no hydrocephalus. The basilar cisterns are widely patent. The gray-white matter brain parenchymal interface is well-maintained. No skull fracture or aggressive osseous lesion noted.  The mastoid air cells and included paranasal sinuses are clear. Examination is limited by motion degradation. No gross acute intracranial abnormality. [address]    Result Date: 2/9/2021  EXAM: Limited abdomen ultrasound. INDICATION: Elevated liver enzymes. COMPARISON: None. TECHNIQUE: Standard protocol limited right upper quadrant abdomen ultrasound. FINDINGS: - Liver: Within normal limits. - Gallbladder: Post cholecystectomy. - Bile ducts: Within normal limits. The common bile duct measures 5 mm. - Pancreas: Within normal limits, although the tail was obscured by bowel gas. - Right kidney: Within normal limits. - Aorta and IVC: Within normal limits. - Portal vein: Within normal limits. - Other: No ascites. Unremarkable study in this postcholecystectomy patient.          Assessment:    Hospital Problems as of 2/9/2021 Date Reviewed: 2/9/2021          Codes Class Noted - Resolved POA    Acute metabolic encephalopathy MMN-79-JL: G93.41  ICD-9-CM: 348.31  2/9/2021 - Present Unknown        Hypokalemia ICD-10-CM: E87.6  ICD-9-CM: 276.8  2/9/2021 - Present Unknown        Hyponatremia ICD-10-CM: E87.1  ICD-9-CM: 276.1  2/9/2021 - Present Unknown        Hepatitis C antibody test positive ICD-10-CM: R76.8  ICD-9-CM: 795.79  2/9/2021 - Present Unknown        Sepsis ([facility] [address] ICD-10-CM: A41.9  ICD-9-CM: 038.9, 995.91  2/8/2021 - Present Unknown        Amphetamine use disorder, mild (HCC) ICD-10-CM: F15.10  ICD-9-CM: 292.9, 305.70  2/8/2021 - Present Unknown               Plan:    Sepsis secondary to soft tissue and skin infection, possible urinary tract infection  Present on admission, most likely skin source with multiple skin wounds + B/l LE cellulitis  - Start IV Unasyn, IV vancomycin  - follow up BCx     Acute Metabolic Encephalopathy - resolved  Secondary to toxic metabolic encephalopathy, septic encephalopathy, electrolyte abnormalities dehydration, hyponatremia  CT head neg     Hyponatremia: resolved with IVF  Hypokalemia: replete and monitor       Elevated LFTs (improved)  Hep C Ab positive  Hep C Ab (+), HIV(-). Abd ultrasound unremarkable. No tenderness on exam  Hep C QT PCR       Poly-substance abuse and social issues  Homelessness  - CM/SW consulted    Diet:  DIET REGULAR  DVT PPx: lovenox  Code: Full Code    Dispo: pending clinical course and PT/OT Eval  Estimated Discharge: TBD based on clinical course      Labs/Imaging Reviewed. Plan discussed with staff, patient/family and are in agreement. Part of this note was written by using a voice dictation software and the note has been proof read but may still contain some grammatical/other typographical errors.     Signed By: Shayy Jacobs MD     February 9, 2021

## 2021-02-11 ENCOUNTER — APPOINTMENT (OUTPATIENT)
Dept: ULTRASOUND IMAGING | Age: 46
DRG: 871 | End: 2021-02-11
Attending: INTERNAL MEDICINE

## 2021-02-11 PROBLEM — L03.90 CELLULITIS: Status: ACTIVE | Noted: 2021-02-11

## 2021-02-11 PROBLEM — F19.188 SKIN LESION DUE TO INTRAVENOUS DRUG ABUSE (HCC): Status: ACTIVE | Noted: 2021-02-11

## 2021-02-11 PROBLEM — T14.8XXA SKIN ABRASION: Status: ACTIVE | Noted: 2021-02-11

## 2021-02-11 PROBLEM — L98.9 SKIN LESION DUE TO INTRAVENOUS DRUG ABUSE (HCC): Status: ACTIVE | Noted: 2021-02-11

## 2021-02-11 LAB
ALBUMIN SERPL-MCNC: 2.3 G/DL (ref 3.5–5)
ALBUMIN/GLOB SERPL: 0.6 {RATIO} (ref 1.2–3.5)
ALP SERPL-CCNC: 61 U/L (ref 50–136)
ALT SERPL-CCNC: 58 U/L (ref 12–65)
ANION GAP SERPL CALC-SCNC: 4 MMOL/L (ref 7–16)
AST SERPL-CCNC: 33 U/L (ref 15–37)
BACTERIA SPEC CULT: ABNORMAL
BACTERIA SPEC CULT: ABNORMAL
BILIRUB DIRECT SERPL-MCNC: <0.1 MG/DL
BILIRUB SERPL-MCNC: 0.4 MG/DL (ref 0.2–1.1)
BUN SERPL-MCNC: 3 MG/DL (ref 6–23)
CALCIUM SERPL-MCNC: 8.5 MG/DL (ref 8.3–10.4)
CHLORIDE SERPL-SCNC: 108 MMOL/L (ref 98–107)
CO2 SERPL-SCNC: 32 MMOL/L (ref 21–32)
CREAT SERPL-MCNC: 0.43 MG/DL (ref 0.6–1)
ERYTHROCYTE [DISTWIDTH] IN BLOOD BY AUTOMATED COUNT: 13.5 % (ref 11.9–14.6)
GLOBULIN SER CALC-MCNC: 4 G/DL (ref 2.3–3.5)
GLUCOSE SERPL-MCNC: 96 MG/DL (ref 65–100)
GRAM STN SPEC: ABNORMAL
HCT VFR BLD AUTO: 32.5 % (ref 35.8–46.3)
HGB BLD-MCNC: 10.5 G/DL (ref 11.7–15.4)
MAGNESIUM SERPL-MCNC: 2.2 MG/DL (ref 1.8–2.4)
MCH RBC QN AUTO: 29.8 PG (ref 26.1–32.9)
MCHC RBC AUTO-ENTMCNC: 32.3 G/DL (ref 31.4–35)
MCV RBC AUTO: 92.3 FL (ref 79.6–97.8)
MM INDURATION POC: 0 MM (ref 0–5)
NRBC # BLD: 0 K/UL (ref 0–0.2)
PLATELET # BLD AUTO: 205 K/UL (ref 150–450)
PMV BLD AUTO: 10.1 FL (ref 9.4–12.3)
POTASSIUM SERPL-SCNC: 3.1 MMOL/L (ref 3.5–5.1)
PPD POC: NEGATIVE NEGATIVE
PROT SERPL-MCNC: 6.3 G/DL (ref 6.3–8.2)
RBC # BLD AUTO: 3.52 M/UL (ref 4.05–5.2)
SERVICE CMNT-IMP: ABNORMAL
SODIUM SERPL-SCNC: 144 MMOL/L (ref 136–145)
WBC # BLD AUTO: 4.3 K/UL (ref 4.3–11.1)

## 2021-02-11 PROCEDURE — 83735 ASSAY OF MAGNESIUM: CPT

## 2021-02-11 PROCEDURE — 74011250636 HC RX REV CODE- 250/636: Performed by: INTERNAL MEDICINE

## 2021-02-11 PROCEDURE — 80048 BASIC METABOLIC PNL TOTAL CA: CPT

## 2021-02-11 PROCEDURE — 85027 COMPLETE CBC AUTOMATED: CPT

## 2021-02-11 PROCEDURE — 74011250636 HC RX REV CODE- 250/636: Performed by: HOSPITALIST

## 2021-02-11 PROCEDURE — 36415 COLL VENOUS BLD VENIPUNCTURE: CPT

## 2021-02-11 PROCEDURE — 74011250637 HC RX REV CODE- 250/637: Performed by: HOSPITALIST

## 2021-02-11 PROCEDURE — 93970 EXTREMITY STUDY: CPT

## 2021-02-11 PROCEDURE — C8929 TTE W OR WO FOL WCON,DOPPLER: HCPCS

## 2021-02-11 PROCEDURE — 97165 OT EVAL LOW COMPLEX 30 MIN: CPT

## 2021-02-11 PROCEDURE — 97530 THERAPEUTIC ACTIVITIES: CPT

## 2021-02-11 PROCEDURE — 65270000029 HC RM PRIVATE

## 2021-02-11 PROCEDURE — 80076 HEPATIC FUNCTION PANEL: CPT

## 2021-02-11 PROCEDURE — 74011250637 HC RX REV CODE- 250/637: Performed by: INTERNAL MEDICINE

## 2021-02-11 PROCEDURE — 74011000250 HC RX REV CODE- 250: Performed by: INTERNAL MEDICINE

## 2021-02-11 RX ORDER — FUROSEMIDE 10 MG/ML
40 INJECTION INTRAMUSCULAR; INTRAVENOUS ONCE
Status: COMPLETED | OUTPATIENT
Start: 2021-02-11 | End: 2021-02-11

## 2021-02-11 RX ADMIN — SODIUM CHLORIDE 20 MILLION UNITS: 9 INJECTION, SOLUTION INTRAVENOUS at 13:22

## 2021-02-11 RX ADMIN — CLINDAMYCIN IN 5 PERCENT DEXTROSE 900 MG: 18 INJECTION, SOLUTION INTRAVENOUS at 10:15

## 2021-02-11 RX ADMIN — CLINDAMYCIN IN 5 PERCENT DEXTROSE 900 MG: 18 INJECTION, SOLUTION INTRAVENOUS at 02:26

## 2021-02-11 RX ADMIN — Medication 10 ML: at 02:01

## 2021-02-11 RX ADMIN — NYSTATIN: 100000 POWDER TOPICAL at 18:07

## 2021-02-11 RX ADMIN — TRAMADOL HYDROCHLORIDE 50 MG: 50 TABLET, FILM COATED ORAL at 09:50

## 2021-02-11 RX ADMIN — ENOXAPARIN SODIUM 40 MG: 100 INJECTION SUBCUTANEOUS at 09:50

## 2021-02-11 RX ADMIN — PERFLUTREN 1 ML: 6.52 INJECTION, SUSPENSION INTRAVENOUS at 13:00

## 2021-02-11 RX ADMIN — POTASSIUM CHLORIDE 40 MEQ: 20 TABLET, EXTENDED RELEASE ORAL at 09:50

## 2021-02-11 RX ADMIN — NYSTATIN: 100000 POWDER TOPICAL at 09:00

## 2021-02-11 RX ADMIN — ACETAMINOPHEN 650 MG: 325 TABLET ORAL at 13:22

## 2021-02-11 RX ADMIN — NYSTATIN: 100000 POWDER TOPICAL at 23:06

## 2021-02-11 RX ADMIN — ZOLPIDEM TARTRATE 5 MG: 5 TABLET, COATED ORAL at 23:09

## 2021-02-11 RX ADMIN — TRAMADOL HYDROCHLORIDE 50 MG: 50 TABLET, FILM COATED ORAL at 18:07

## 2021-02-11 RX ADMIN — Medication 10 ML: at 13:24

## 2021-02-11 RX ADMIN — Medication 10 ML: at 05:28

## 2021-02-11 RX ADMIN — FUROSEMIDE 40 MG: 10 INJECTION, SOLUTION INTRAMUSCULAR; INTRAVENOUS at 16:58

## 2021-02-11 RX ADMIN — Medication 10 ML: at 23:07

## 2021-02-11 RX ADMIN — POTASSIUM CHLORIDE 40 MEQ: 20 TABLET, EXTENDED RELEASE ORAL at 17:00

## 2021-02-11 NOTE — PROGRESS NOTES
Pt in bed resting. A&OX4. Pt states it feels like she is getting a yeast infection. Nystatin powder applied to vaginal area. Will monitor for effectiveness. Call light within reach. Preparing to give report to oncoming shift.

## 2021-02-11 NOTE — PROGRESS NOTES
Hospitalist Progress Note       Name: Montse Piper MRN: [id number]  : 1975  Age:46 y.o.  female  Admit Date:  2021 LOS: 3    Reason for Admission:  Sepsis ([facility] [A41.9]  Altered mental status [R41.82]  Amphetamine use disorder, mild ([facility] [F15.10]    Hospital Course:  Please refer to the admission H&P for details of presentation. In summary, Montse Piper is a 55 y.o. homeless female with medical history significant  For polysubstance abuse(methamphetamine, cannabis) who presented via EMS wanting to detox from methamphetamine. She was noted to have multiple skin wounds all over her body in various stages. Noted to have temp of 101.5F, tachycardia of 115, Na 129, K 2.7, elevated LFTs, e;evated CRP. UA noted to be 3+ bacteria with 10-20 WBCs. Patient was started on broad spectrum antibiotics. BCx grew Strep Pyogenes and antibiotics were de-escalated. Subjective/24 hr Events (21) : Patient is seen and examined at bedside. No acute events reported overnight by nursing staff. Reports improvement in her fatigue. Complains LE swelling and states that she has gotten lasix in the past due to edema. No pruritis or additional skin lesions. Patient denies fever, chills, chest pains, shortness of breath, n/v, abdominal pain. ROS: 10 point review of systems is otherwise negative with the exception of the elements mentioned above. Objective:    Patient Vitals for the past 24 hrs:   Temp Pulse Resp BP SpO2   21 1233 98.2 °F (36.8 °C) 76 18 125/80 98 %   21 0824 98.6 °F (37 °C) 73 18 126/80 96 %   21 0522 98.4 °F (36.9 °C) 71 18 121/76 98 %   21 0010 98.6 °F (37 °C) 73 18 119/63 99 %   02/10/21 2039 98.7 °F (37.1 °C) 80 18 129/69 99 %     Oxygen Therapy  O2 Sat (%): 98 % (21 1233)  Pulse via Oximetry: 98 beats per minute (21 1840)  O2 Device: Room air (21 4558)    Body mass index is 45.5 kg/m².     Physical Exam:   General:     alert, awake, no acute distress. Appears tired  Head:   normocephalic, atraumatic  Eyes, Ears, nose: PERRL, EOMI. Normal conjunctiva  Neck:    supple, non-tender. Trachea midline. Lungs:   CTAB, no wheezing, rhonchi, rales  Cardiac:   RRR, Normal S1 and S2. Abdomen:   Soft, non distended, nontender, +BS,  Extremities:   Warm, dry. Improved B/L LE erythema. +2 edema. Skin:   No rashes, no jaundice, multiple skin wounds through out and all appeared crusted. One lesion in right forearm appears to have fistula into the subQ. Neuro:  AAOx3.  No gross focal neurological deficit  Psychiatric:  No anxiety, calm, cooperative    Data Review:  I have reviewed all labs, meds, and studies from the last 24 hours:      Labs:    Recent Results (from the past 24 hour(s))   NT-PRO BNP    Collection Time: 02/10/21  8:25 PM   Result Value Ref Range    NT pro- (H) 5 - 125 PG/ML   D DIMER    Collection Time: 02/10/21  8:25 PM   Result Value Ref Range    D DIMER 4.62 (H) <0.56 ug/ml(FEU)   CBC W/O DIFF    Collection Time: 02/11/21  6:30 AM   Result Value Ref Range    WBC 4.3 4.3 - 11.1 K/uL    RBC 3.52 (L) 4.05 - 5.2 M/uL    HGB 10.5 (L) 11.7 - 15.4 g/dL    HCT 32.5 (L) 35.8 - 46.3 %    MCV 92.3 79.6 - 97.8 FL    MCH 29.8 26.1 - 32.9 PG    MCHC 32.3 31.4 - 35.0 g/dL    RDW 13.5 11.9 - 14.6 %    PLATELET 769 383 - 723 K/uL    MPV 10.1 9.4 - 12.3 FL    ABSOLUTE NRBC 0.00 0.0 - 0.2 K/uL   METABOLIC PANEL, BASIC    Collection Time: 02/11/21  6:30 AM   Result Value Ref Range    Sodium 144 136 - 145 mmol/L    Potassium 3.1 (L) 3.5 - 5.1 mmol/L    Chloride 108 (H) 98 - 107 mmol/L    CO2 32 21 - 32 mmol/L    Anion gap 4 (L) 7 - 16 mmol/L    Glucose 96 65 - 100 mg/dL    BUN 3 (L) 6 - 23 MG/DL    Creatinine 0.43 (L) 0.6 - 1.0 MG/DL    GFR est AA >60 >60 ml/min/1.73m2    GFR est non-AA >60 >60 ml/min/1.73m2    Calcium 8.5 8.3 - 10.4 MG/DL   HEPATIC FUNCTION PANEL    Collection Time: 02/11/21  6:30 AM   Result Value Ref Range    Protein, total 6.3 6.3 - 8.2 g/dL    Albumin 2.3 (L) 3.5 - 5.0 g/dL    Globulin 4.0 (H) 2.3 - 3.5 g/dL    A-G Ratio 0.6 (L) 1.2 - 3.5      Bilirubin, total 0.4 0.2 - 1.1 MG/DL    Bilirubin, direct <0.1 <0.4 MG/DL    Alk.  phosphatase 61 50 - 136 U/L    AST (SGOT) 33 15 - 37 U/L    ALT (SGPT) 58 12 - 65 U/L   MAGNESIUM    Collection Time: 02/11/21  6:30 AM   Result Value Ref Range    Magnesium 2.2 1.8 - 2.4 mg/dL         All Micro Results     Procedure Component Value Units Date/Time    CULTURE, BLOOD [[id number]]  (Abnormal)  (Susceptibility) Collected: 02/08/21 1252    Order Status: Completed Specimen: Blood Updated: 02/11/21 0702     Special Requests: --        LEFT  HAND       GRAM STAIN GRAM POS COCCI IN CHAINS         ANAEROBIC BOTTLE POSITIVE               RESULTS VERIFIED, PHONED TO AND READ BACK BY Nick Ni RN @7033 ON [id number] [place]           Culture result:       STREPTOCOCCUS PYOGENES SERO GROUP A Reported to Dept of Health                  Refer to Blood Culture ID Panel Accession  [id number]      BLOOD CULTURE ID PANEL [[id number]]  (Abnormal) Collected: 02/08/21 1252    Order Status: Completed Specimen: Blood Updated: 02/09/21 0648     Acc. no. from Micro Order [id number]     Streptococcus Detected        Streptococcus pyogenes (Group A) Detected        Comment: RESULTS VERIFIED, PHONED TO AND READ BACK BY  Nick Ni RN @0133 ON [id number] [place]          INTERPRETATION       Gram Positive cocci, Identified by realtime PCR as  Streptococcus pyogenes (Group A Strep)          CULTURE, BLOOD [[id number]]     Order Status: Canceled Specimen: Blood           Current Meds:  Current Facility-Administered Medications   Medication Dose Route Frequency    penicillin G potassium (PFIZERPEN) 20 Million Units in 0.9% sodium chloride 1,000 mL infusion  20 Million Units IntraVENous CONTINUOUS    traMADoL (ULTRAM) tablet 50 mg  50 mg Oral Q6H PRN    HYDROmorphone (PF) (DILAUDID) injection 0.5 mg  0.5 mg IntraVENous Q6H PRN    nystatin (MYCOSTATIN) 100,000 unit/gram powder   Topical TID    potassium chloride (K-DUR, KLOR-CON) SR tablet 40 mEq  40 mEq Oral BID    sodium chloride (NS) flush 5-40 mL  5-40 mL IntraVENous Q8H    sodium chloride (NS) flush 5-40 mL  5-40 mL IntraVENous PRN    acetaminophen (TYLENOL) tablet 650 mg  650 mg Oral Q6H PRN    Or    acetaminophen (TYLENOL) suppository 650 mg  650 mg Rectal Q6H PRN    polyethylene glycol (MIRALAX) packet 17 g  17 g Oral DAILY PRN    promethazine (PHENERGAN) tablet 12.5 mg  12.5 mg Oral Q6H PRN    Or    ondansetron (ZOFRAN) injection 4 mg  4 mg IntraVENous Q6H PRN    enoxaparin (LOVENOX) injection 40 mg  40 mg SubCUTAneous DAILY    zolpidem (AMBIEN) tablet 5 mg  5 mg Oral QHS PRN         Other Studies:  Ct Head Wo Cont    Result Date: 2/8/2021  CT of the head without contrast. CLINICAL INDICATION: Altered mental status PROCEDURE: Serial thin section axial images are obtained from the cranial vertex through the skull base without the administration of intravenous contrast. Radiation dose reduction techniques were used for this study. Our CT scanners use one or all of the following: Automated exposure control, adjusted of the mA and/or kV according to patient size, iterative reconstruction COMPARISON: Head CT dated 7/27/2016. FINDINGS: Images are degraded by motion. There is no acute intracranial hemorrhage, mass, or mass effect. No abnormal extra-axial fluid collections identified. There is no hydrocephalus. The basilar cisterns are widely patent. The gray-white matter brain parenchymal interface is well-maintained. No skull fracture or aggressive osseous lesion noted. The mastoid air cells and included paranasal sinuses are clear. Examination is limited by motion degradation. No gross acute intracranial abnormality. [id number]    Result Date: 2/9/2021  EXAM: Limited abdomen ultrasound. INDICATION: Elevated liver enzymes. COMPARISON: None.  TECHNIQUE: Standard protocol limited right upper quadrant abdomen ultrasound. FINDINGS: - Liver: Within normal limits. - Gallbladder: Post cholecystectomy. - Bile ducts: Within normal limits. The common bile duct measures 5 mm. - Pancreas: Within normal limits, although the tail was obscured by bowel gas. - Right kidney: Within normal limits. - Aorta and IVC: Within normal limits. - Portal vein: Within normal limits. - Other: No ascites. Unremarkable study in this postcholecystectomy patient. Assessment:    Hospital Problems as of 2/11/2021 Date Reviewed: 2/9/2021          Codes Class Noted - Resolved POA    Cellulitis ICD-10-CM: L03.90  ICD-9-CM: 682.9  2/11/2021 - Present Yes        Skin abrasion ICD-10-CM: T14. 8XXA  ICD-9-CM: 919.0  2/11/2021 - Present Yes        Skin lesion due to intravenous drug abuse ([facility] ICD-10-CM: L98.9, F19.188  ICD-9-CM: 709.8, 305.90  2/11/2021 - Present Unknown        Bacteremia due to Streptococcus ICD-10-CM: R78.81, B95.5  ICD-9-CM: 790.7, 041.00  2/10/2021 - Present Yes        Acute metabolic encephalopathy AGJ-05-QC: G93.41  ICD-9-CM: 348.31  2/9/2021 - Present Yes        Hypokalemia ICD-10-CM: E87.6  ICD-9-CM: 276.8  2/9/2021 - Present Unknown        Hyponatremia ICD-10-CM: E87.1  ICD-9-CM: 276.1  2/9/2021 - Present Unknown        Hepatitis C antibody test positive ICD-10-CM: R76.8  ICD-9-CM: 795.79  2/9/2021 - Present Yes        * (Principal) Sepsis ([facility] ICD-10-CM: A41.9  ICD-9-CM: 038.9, 995.91  2/8/2021 - Present Yes        Amphetamine use disorder, mild ([facility]) ICD-10-CM: F15.10  ICD-9-CM: 292.9, 305.70  2/8/2021 - Present Yes               Plan:    Sepsis secondary to soft tissue and skin infection, possible urinary tract infection  Strep Pyogenes Bacteremia  Present on admission, most likely skin source with multiple skin wounds + B/l LE cellulitis  BCx with Strep Pyogenes. Doppler of LE neg for DVT.    ECHO without obvious vegetations  - R UE ultrasound to eval for abscess collection  - echo to rule out vegetations  - ID consult for abx management  - penicillin G 20million units/q24hrs for now. Will hold off on concurrent linezolide given patient is clinically improving. LE edema  Doppler is negative for DVT. pBNP of 700. ECHO with normal EF and normal diastolic function  - will give a trial of lasix     Acute Metabolic Encephalopathy - resolved  Secondary to toxic metabolic encephalopathy, septic encephalopathy, electrolyte abnormalities dehydration, hyponatremia  CT head neg     Hyponatremia: resolved with IVF  Hypokalemia: replete and monitor       Elevated LFTs (improved)  Hep C Ab positive  Hep C Ab (+), HIV(-). Abd ultrasound unremarkable. No tenderness on exam  Hep C QT PCR       Poly-substance abuse and social issues  Homelessness  - CM/SW consulted    Hypokalemia: replete and monitor     Diet:  DIET REGULAR  DVT PPx: lovenox  Code: Full Code    Dispo: pending clinical course and PT/OT Eval  Estimated Discharge: TBD based on clinical course      Labs/Imaging Reviewed. Plan discussed with staff, patient/family and are in agreement. Part of this note was written by using a voice dictation software and the note has been proof read but may still contain some grammatical/other typographical errors.     Signed By: Magui Aguirre MD     February 11, 2021

## 2021-02-12 ENCOUNTER — APPOINTMENT (OUTPATIENT)
Dept: ULTRASOUND IMAGING | Age: 46
DRG: 871 | End: 2021-02-12
Attending: INTERNAL MEDICINE

## 2021-02-12 LAB
ANION GAP SERPL CALC-SCNC: 5 MMOL/L (ref 7–16)
BUN SERPL-MCNC: 5 MG/DL (ref 6–23)
CALCIUM SERPL-MCNC: 8.5 MG/DL (ref 8.3–10.4)
CHLORIDE SERPL-SCNC: 109 MMOL/L (ref 98–107)
CO2 SERPL-SCNC: 29 MMOL/L (ref 21–32)
CREAT SERPL-MCNC: 0.5 MG/DL (ref 0.6–1)
ERYTHROCYTE [DISTWIDTH] IN BLOOD BY AUTOMATED COUNT: 13.4 % (ref 11.9–14.6)
GLUCOSE SERPL-MCNC: 94 MG/DL (ref 65–100)
HCT VFR BLD AUTO: 34.9 % (ref 35.8–46.3)
HGB BLD-MCNC: 11.1 G/DL (ref 11.7–15.4)
MCH RBC QN AUTO: 29.2 PG (ref 26.1–32.9)
MCHC RBC AUTO-ENTMCNC: 31.8 G/DL (ref 31.4–35)
MCV RBC AUTO: 91.8 FL (ref 79.6–97.8)
NRBC # BLD: 0 K/UL (ref 0–0.2)
PLATELET # BLD AUTO: 230 K/UL (ref 150–450)
PMV BLD AUTO: 10.1 FL (ref 9.4–12.3)
POTASSIUM SERPL-SCNC: 3.9 MMOL/L (ref 3.5–5.1)
RBC # BLD AUTO: 3.8 M/UL (ref 4.05–5.2)
SODIUM SERPL-SCNC: 143 MMOL/L (ref 136–145)
WBC # BLD AUTO: 4.4 K/UL (ref 4.3–11.1)

## 2021-02-12 PROCEDURE — 65270000029 HC RM PRIVATE

## 2021-02-12 PROCEDURE — 76937 US GUIDE VASCULAR ACCESS: CPT

## 2021-02-12 PROCEDURE — 87040 BLOOD CULTURE FOR BACTERIA: CPT

## 2021-02-12 PROCEDURE — 36415 COLL VENOUS BLD VENIPUNCTURE: CPT

## 2021-02-12 PROCEDURE — 76881 US COMPL JOINT R-T W/IMG: CPT

## 2021-02-12 PROCEDURE — 74011250637 HC RX REV CODE- 250/637: Performed by: INTERNAL MEDICINE

## 2021-02-12 PROCEDURE — 80048 BASIC METABOLIC PNL TOTAL CA: CPT

## 2021-02-12 PROCEDURE — 74011250637 HC RX REV CODE- 250/637: Performed by: HOSPITALIST

## 2021-02-12 PROCEDURE — 97530 THERAPEUTIC ACTIVITIES: CPT

## 2021-02-12 PROCEDURE — 77030041974 HC CATH SYS PERIPH TELE -B

## 2021-02-12 PROCEDURE — 74011000250 HC RX REV CODE- 250: Performed by: INTERNAL MEDICINE

## 2021-02-12 PROCEDURE — 74011250636 HC RX REV CODE- 250/636: Performed by: HOSPITALIST

## 2021-02-12 PROCEDURE — 85027 COMPLETE CBC AUTOMATED: CPT

## 2021-02-12 PROCEDURE — 74011250636 HC RX REV CODE- 250/636: Performed by: INTERNAL MEDICINE

## 2021-02-12 RX ORDER — SILVER SULFADIAZINE 10 G/1000G
CREAM TOPICAL 2 TIMES DAILY
Status: DISCONTINUED | OUTPATIENT
Start: 2021-02-12 | End: 2021-02-15 | Stop reason: HOSPADM

## 2021-02-12 RX ORDER — FUROSEMIDE 10 MG/ML
40 INJECTION INTRAMUSCULAR; INTRAVENOUS DAILY
Status: DISCONTINUED | OUTPATIENT
Start: 2021-02-12 | End: 2021-02-15 | Stop reason: HOSPADM

## 2021-02-12 RX ADMIN — TRAMADOL HYDROCHLORIDE 50 MG: 50 TABLET, FILM COATED ORAL at 17:19

## 2021-02-12 RX ADMIN — NYSTATIN: 100000 POWDER TOPICAL at 22:53

## 2021-02-12 RX ADMIN — ENOXAPARIN SODIUM 40 MG: 100 INJECTION SUBCUTANEOUS at 08:53

## 2021-02-12 RX ADMIN — Medication 10 ML: at 05:14

## 2021-02-12 RX ADMIN — SODIUM CHLORIDE 20 MILLION UNITS: 9 INJECTION, SOLUTION INTRAVENOUS at 15:27

## 2021-02-12 RX ADMIN — TRAMADOL HYDROCHLORIDE 50 MG: 50 TABLET, FILM COATED ORAL at 22:53

## 2021-02-12 RX ADMIN — SILVER SULFADIAZINE: 10 CREAM TOPICAL at 17:19

## 2021-02-12 RX ADMIN — Medication 10 ML: at 14:00

## 2021-02-12 RX ADMIN — ZOLPIDEM TARTRATE 5 MG: 5 TABLET, COATED ORAL at 23:01

## 2021-02-12 RX ADMIN — TRAMADOL HYDROCHLORIDE 50 MG: 50 TABLET, FILM COATED ORAL at 11:25

## 2021-02-12 RX ADMIN — Medication 10 ML: at 23:01

## 2021-02-12 RX ADMIN — NYSTATIN: 100000 POWDER TOPICAL at 09:00

## 2021-02-12 RX ADMIN — NYSTATIN: 100000 POWDER TOPICAL at 16:00

## 2021-02-12 NOTE — WOUND CARE
"  VS:    BP 91/56 (BP Location: Left arm)   Pulse 79   Temp 97.9  F (36.6  C) (Oral)   Resp 16   Ht 1.594 m (5' 2.75\")   Wt 57.5 kg (126 lb 11.2 oz)   SpO2 97%   Breastfeeding No   BMI 22.62 kg/m       Output:    Voids spontaneously without difficulty, PVR 9 mls.  Pt had two loose stools this evening (bowel meds not given).    Activity:     Assist x1 with walker and gait belt. Jerald stockings on.    Skin: CDI ex: spinal incision, skin tear on R forearm. Bruises on extremities.    Pain:    Pt experiencing minimal pain this shift, did not want prn pain meds. Pt took 5 mg oxycodone to prevent waking from pain in night.    Neuro/CMS:    Intact    Dressing(s):    CDI    Diet:    Regular diet. Ate 50% of dinner. Pt stated she is a light eater.    Equipment:     Icyhot machine, walker, and gait belt.   IV's/Drains:    None.   Plan:    Discharge to TCU once approved by ortho.   Additional Info:                 " Patient has known history of IV drug use, recently released from alf per her report she is also homeless. Tips of fingers and toes with some scabs and partial thickness open areas, concern for vasculitis or similar issue. Patient has had \"itching\" with Iodine. Patient cannot tolerate surface debridement for the finger or toe wounds secondary to pain. Right forearm with 3x3cm area of erythema with 0.8x0.5x? Open area in center of erythema, concern for FB or abscess, recommend considering imaging and surgical debridement, unable to pack as opening is not large enough. Recommend a topical treatment such as silvadene to the superficial areas. Will monitor.

## 2021-02-12 NOTE — PROGRESS NOTES
ACUTE PHYSICAL THERAPY GOALS:  (Developed with and agreed upon by patient and/or caregiver.)  LTG:  (1.)Ms. Vázquez will move from supine to sit and sit to supine, scoot up and down and roll side to side in bed INDEPENDENTLY with bed flat within 7 treatment day(s).    (2.)Ms. Vázquez will transfer from bed to chair and chair to bed INDEPENDENTLY within 7 treatment day(s).    (3.)Ms. Vázquez will ambulate INDEPENDENTLY for 500 feet with the least restrictive device within 7 treatment day(s).  (4.)Ms. Vázquez will ascend and descend 10 steps with SUPERVISION using handrail(s) within 7 days.    PHYSICAL THERAPY: Daily Note and PM Treatment Day # 2    Summer Vázquez is a 46 y.o. female   PRIMARY DIAGNOSIS: Sepsis ([facility])  Sepsis ([facility]) [A41.9]  Altered mental status [R41.82]  Amphetamine use disorder, mild ([facility]) [F15.10]         ASSESSMENT:     REHAB RECOMMENDATIONS: CURRENT LEVEL OF FUNCTION:  (Most Recently Demonstrated)   Recommendation to date pending progress:  Setting:  • No further skilled therapy   Equipment:   • None Bed Mobility:  • Independent  Sit to Stand:  • Supervision  Transfers:  • Supervision  Gait/Mobility:  • Supervision     ASSESSMENT:  Ms. Vázquez demonstrates improved gait safety and independence and was able to increase overall gait distance compared to prior session. Sciatic pain improved. 400 ft supervision, no DME. Anticipate no dc needs.     SUBJECTIVE:   Ms. Vázquez states, \"I didn't sleep at all last night.\"    SOCIAL HISTORY/ LIVING ENVIRONMENT: homeless. Recently released from custodial.      OBJECTIVE:     PAIN: VITAL SIGNS: LINES/DRAINS:   Pre Treatment: Pain Screen  Pain Scale 1: Numeric (0 - 10)  Pain Intensity 1: 0  Post Treatment: 0/10 Vital Signs  O2 Device: Room air IV  O2 Device: Room air     MOBILITY: I Mod I S SBA CGA Min Mod Max Total  NT x2 Comments:   Bed Mobility    Rolling [x] [] [] [] [] [] [] [] [] [] []    Supine to Sit [x] [] [] [] [] [] [] [] [] [] []    Scooting [x] [] [] [] [] []  [] [] [] [] []    Sit to Supine [] [] [] [] [] [] [] [] [] [] []    Transfers    Sit to Stand [] [] [x] [] [] [] [] [] [] [] []    Bed to Chair [] [] [x] [] [] [] [] [] [] [] []    Stand to Sit [] [] [x] [] [] [] [] [] [] [] []    I=Independent, Mod I=Modified Independent, S=Supervision, SBA=Standby Assistance, CGA=Contact Guard Assistance,   Min=Minimal Assistance, Mod=Moderate Assistance, Max=Maximal Assistance, Total=Total Assistance, NT=Not Tested    GAIT: I Mod I S SBA CGA Min Mod Max Total  NT x2 Comments:   Level of Assistance [] [] [x] [] [] [] [] [] [] [] []    Distance 400    DME None    Gait Quality Mild sciatic pain towards the end    Weightbearing  Status N/A     I=Independent, Mod I=Modified Independent, S=Supervision, SBA=Standby Assistance, CGA=Contact Guard Assistance,   Min=Minimal Assistance, Mod=Moderate Assistance, Max=Maximal Assistance, Total=Total Assistance, NT=Not Tested    PLAN:   FREQUENCY/DURATION: PT Plan of Care: 3 times/week for duration of hospital stay or until stated goals are met, whichever comes first.  TREATMENT:     TREATMENT:   ($$ Therapeutic Activity: 8-22 mins    )  Therapeutic Activity (12 Minutes): Therapeutic activity included Rolling, Supine to Sit, Transfer Training, Ambulation on level ground, Sitting balance  and Standing balance to improve functional Mobility, Strength, Activity tolerance and balance.     AFTER TREATMENT POSITION/PRECAUTIONS:  Chair, Needs within reach and RN notified    INTERDISCIPLINARY COLLABORATION:  RN/PCT and PT/PTA    TOTAL TREATMENT DURATION:  PT Patient Time In/Time Out  Time In: 1522  Time Out: [id number], DPT

## 2021-02-12 NOTE — CONSULTS
Infectious Disease Consult    Impression:   · Sepsis 2/2 GAS bacteremia, likely from cellulitis. RUE with devitalized appearing tissue but no area to drain. Will ask for wound care assessment, may need formal debridement  · Polysubstance abuse and homelessness  · HIV negative  · Known HCV: HCV ab +, VL 9.76M. Has been referred for treatment but has yet to make appointment due to incarceration and homelessness. Discussed potential for repeat infection and need for substance abstinence. Plan:   · Continue PCN for now  · Repeating blood cultures today x 2  · If repeat cx negative and she continues to improve, can transition to amoxicillin 1g TID to complete 14 total days of therapy  · GAS low risk for endocarditis   · Has already been referred for HCV treatment  · Wound care assessment, mostly for RUE; this area may need formal debridement    Thank you for allowing us to participate in the care of this patient, ID will not continue to follow. Please don't hesitate to contact us with further questions or concerns, or if repeat cultures remain positive    Anti-infectives:   1. Ampicillin 2/9-2/10  2. Ceftriaxone 2/7x 1  3. Clindamycin 210-2/11  4. PCN 2/11-present    Subjective:   Date of Consultation:  February 12, 2021  Date of Admission: 2/8/2021   Referring Physician: Juan Jose Manrique    Patient is a 55 y.o. female with h/o homelessness and polysubstance abuse who presented 2/7for sores on hands/feet as well as wishing to detox from meth. She was altered at the time of arrival so history is limited. She did have multiple wounds with some concern for infection. T 101.5, , Na 129, K 2.7, Cr 0.72, , . Started on broad spectrum abx, blood cultures obtained. Since that time she has been afebrile. Blood cultures have returned with GAS. Changed to PCN. US of RUE due to swelling which shows concern for phlegmon. TTE negative.   Today she states she feels like she is \"not getting better\" because she has multiple eschars over hands and feet. Does have an area on RUE where she was attempting to inject and \"missed\", this has open area, devitalized tissue but no abscess on imaging. She was recently let go from nursing home and has been homeless. Notes trying to quit IVDU but is having trouble due to social situation. Patient Active Problem List   Diagnosis Code    Sepsis ([facility] A41.9    Amphetamine use disorder, mild ([address] F15.10    Acute metabolic encephalopathy F33.69    Hypokalemia E87.6    Hyponatremia E87.1    Hepatitis C antibody test positive R76.8    Bacteremia due to Streptococcus R78.81, B95.5    Cellulitis L03.90    Skin abrasion T14. 8XXA    Skin lesion due to intravenous drug abuse ([facility] L98.9, F19.188     Past Medical History:   Diagnosis Date    Other ill-defined conditions(799.89)     sciatica; high cholesterol      No family history on file. Social History     Tobacco Use    Smoking status: Current Every Day Smoker     Packs/day: 1.00   Substance Use Topics    Alcohol use: Yes     Comment: socially     Past Surgical History:   Procedure Laterality Date    HX  SECTION      x 4    HX CHOLECYSTECTOMY        Prior to Admission medications    Medication Sig Start Date End Date Taking? Authorizing Provider   cephALEXin (Keflex) 500 mg capsule Take 1 Cap by mouth two (2) times a day for 7 days. 2/8/21 2/15/21 Yes Christiana Benedict MD   HYDROcodone-acetaminophen [facility]) 7.5-325 mg per tablet Take 1 Tab by mouth every six (6) hours as needed for Pain. Max Daily Amount: 4 Tabs. 16   Kevyn Wolff MD   traMADol [place] [facility]) 50 mg tablet Take 1 Tab by mouth every eight (8) hours as needed for Pain. Max Daily Amount: 150 mg. 16   Jason Case PA   carisoprodol (SOMA) 350 mg tablet Take 1 Tab by mouth every eight (8) hours as needed for Muscle Spasm(s).  Max Daily Amount: 1,050 mg. 16   Jason Case [place]     Allergies   Allergen Reactions    Erythromycin Shortness of Breath        Review of Systems:  A comprehensive review of systems was negative except for that written in the History of Present Illness. Objective:   Blood pressure 116/69, pulse 69, temperature 98 °F (36.7 °C), resp. rate 16, height 5' 5\" (1.651 m), weight 124 kg (273 lb 6.4 oz), SpO2 97 %. Temp (24hrs), Av.2 °F (36.8 °C), Min:97.6 °F (36.4 °C), Max:98.7 °F (37.1 °C)       Exam:    General:  Alert, cooperative, well noursished, well developed, appears stated age   Eyes:  Sclera anicteric. Pupils equally round and reactive to light. Mouth/Throat: Mucous membranes normal, oral pharynx clear   Neck: Supple   Lungs:   Clear to auscultation bilaterally, good effort   CV:  Regular rate and rhythm,no murmur, click, rub or gallop   Abdomen:   Soft, non-tender.  bowel sounds normal. non-distended   Extremities: No cyanosis or edema   Skin: Multiple eschars across hands and feet, RUE with round, erythematous and indurated area, open defect without drainage, devitazlied appearing tissue   Lymph nodes: Cervical and supraclavicular normal   Musculoskeletal: No swelling or deformity   Lines/Devices:  Intact, no erythema, drainage or tenderness   Psych: Alert and oriented, normal mood affect given the setting       Data Review:   Recent Results (from the past 24 hour(s))   PLEASE READ & DOCUMENT PPD TEST IN 48 HRS    Collection Time: 21  3:29 PM   Result Value Ref Range    PPD Negative Negative    mm Induration 0 0 - 5 mm   CBC W/O DIFF    Collection Time: 21  5:54 AM   Result Value Ref Range    WBC 4.4 4.3 - 11.1 K/uL    RBC 3.80 (L) 4.05 - 5.2 M/uL    HGB 11.1 (L) 11.7 - 15.4 g/dL    HCT 34.9 (L) 35.8 - 46.3 %    MCV 91.8 79.6 - 97.8 FL    MCH 29.2 26.1 - 32.9 PG    MCHC 31.8 31.4 - 35.0 g/dL    RDW 13.4 11.9 - 14.6 %    PLATELET 598 463 - 173 K/uL    MPV 10.1 9.4 - 12.3 FL    ABSOLUTE NRBC 0.00 0.0 - 0.2 K/uL   METABOLIC PANEL, BASIC    Collection Time: 21  5:54 AM   Result Value Ref Range    Sodium 143 136 - 145 mmol/L    Potassium 3.9 3.5 - 5.1 mmol/L    Chloride 109 (H) 98 - 107 mmol/L    CO2 29 21 - 32 mmol/L    Anion gap 5 (L) 7 - 16 mmol/L    Glucose 94 65 - 100 mg/dL    BUN 5 (L) 6 - 23 MG/DL    Creatinine 0.50 (L) 0.6 - 1.0 MG/DL    GFR est AA >60 >60 ml/min/1.73m2    GFR est non-AA >60 >60 ml/min/1.73m2    Calcium 8.5 8.3 - 10.4 MG/DL        Microbiology:    All Micro Results     Procedure Component Value Units Date/Time    CULTURE, BLOOD [[id number]]  (Abnormal)  (Susceptibility) Collected: 02/08/21 1252    Order Status: Completed Specimen: Blood Updated: 02/11/21 0702     Special Requests: --        LEFT  HAND       GRAM STAIN GRAM POS COCCI IN CHAINS         ANAEROBIC BOTTLE POSITIVE               RESULTS VERIFIED, PHONED TO AND READ BACK BY Nick Ni RN @5736 ON [id number] [place]           Culture result:       STREPTOCOCCUS PYOGENES SERO GROUP A Reported to Dept of Health                  Refer to Blood Culture ID Panel Accession  [id number]      BLOOD CULTURE ID PANEL [[id number]]  (Abnormal) Collected: 02/08/21 1252    Order Status: Completed Specimen: Blood Updated: 02/09/21 0648     Acc. no. from Micro Order [id number]     Streptococcus Detected        Streptococcus pyogenes (Group A) Detected        Comment: RESULTS VERIFIED, PHONED TO AND READ BACK BY  HAYLEY PATINO RN @0145 ON [id number] [place]          INTERPRETATION       Gram Positive cocci, Identified by realtime PCR as  Streptococcus pyogenes (Group A Strep)          CULTURE, BLOOD [[id number]]     Order Status: Canceled Specimen: Blood           Studies:    2/12 US RUE  FINDINGS: In the superficial soft tissues there is an approximate 1.5 cm area of  ill-defined edema, with the appearance of inflammatory or infectious phlegmon. No discrete abscess/drainable fluid collection is visualized.     Signed By: Joann Mauro MD     February 12, 2021

## 2021-02-13 PROBLEM — B18.2 CHRONIC HEPATITIS C WITHOUT HEPATIC COMA (HCC): Status: ACTIVE | Noted: 2021-02-13

## 2021-02-13 LAB
ANION GAP SERPL CALC-SCNC: 4 MMOL/L (ref 7–16)
BUN SERPL-MCNC: 7 MG/DL (ref 6–23)
CALCIUM SERPL-MCNC: 9.1 MG/DL (ref 8.3–10.4)
CHLORIDE SERPL-SCNC: 110 MMOL/L (ref 98–107)
CO2 SERPL-SCNC: 27 MMOL/L (ref 21–32)
CREAT SERPL-MCNC: 0.53 MG/DL (ref 0.6–1)
ERYTHROCYTE [DISTWIDTH] IN BLOOD BY AUTOMATED COUNT: 13.2 % (ref 11.9–14.6)
GLUCOSE SERPL-MCNC: 89 MG/DL (ref 65–100)
HCT VFR BLD AUTO: 38.9 % (ref 35.8–46.3)
HCV GENOTYPE: NORMAL
HCV GENTYP SERPL NAA+PROBE: NORMAL
HCV RNA SERPL NAA+PROBE-ACNC: NORMAL IU/ML
HCV RNA SERPL NAA+PROBE-LOG IU: 6.99 LOG10 IU/ML
HGB BLD-MCNC: 12.3 G/DL (ref 11.7–15.4)
MCH RBC QN AUTO: 29.3 PG (ref 26.1–32.9)
MCHC RBC AUTO-ENTMCNC: 31.6 G/DL (ref 31.4–35)
MCV RBC AUTO: 92.6 FL (ref 79.6–97.8)
NRBC # BLD: 0 K/UL (ref 0–0.2)
PLATELET # BLD AUTO: 263 K/UL (ref 150–450)
PLEASE NOTE, 550474: NORMAL
PMV BLD AUTO: 9.9 FL (ref 9.4–12.3)
POTASSIUM SERPL-SCNC: 4.6 MMOL/L (ref 3.5–5.1)
RBC # BLD AUTO: 4.2 M/UL (ref 4.05–5.2)
SODIUM SERPL-SCNC: 141 MMOL/L (ref 136–145)
TEST INFORMATION, 550045: NORMAL
WBC # BLD AUTO: 5.5 K/UL (ref 4.3–11.1)

## 2021-02-13 PROCEDURE — 74011250636 HC RX REV CODE- 250/636: Performed by: INTERNAL MEDICINE

## 2021-02-13 PROCEDURE — 85027 COMPLETE CBC AUTOMATED: CPT

## 2021-02-13 PROCEDURE — 74011250636 HC RX REV CODE- 250/636: Performed by: HOSPITALIST

## 2021-02-13 PROCEDURE — 65270000029 HC RM PRIVATE

## 2021-02-13 PROCEDURE — 74011250637 HC RX REV CODE- 250/637: Performed by: INTERNAL MEDICINE

## 2021-02-13 PROCEDURE — 36415 COLL VENOUS BLD VENIPUNCTURE: CPT

## 2021-02-13 PROCEDURE — 80048 BASIC METABOLIC PNL TOTAL CA: CPT

## 2021-02-13 PROCEDURE — 74011250637 HC RX REV CODE- 250/637: Performed by: HOSPITALIST

## 2021-02-13 RX ADMIN — ACETAMINOPHEN 650 MG: 325 TABLET ORAL at 21:31

## 2021-02-13 RX ADMIN — ZOLPIDEM TARTRATE 5 MG: 5 TABLET, COATED ORAL at 20:06

## 2021-02-13 RX ADMIN — SILVER SULFADIAZINE: 10 CREAM TOPICAL at 08:46

## 2021-02-13 RX ADMIN — ENOXAPARIN SODIUM 40 MG: 100 INJECTION SUBCUTANEOUS at 08:46

## 2021-02-13 RX ADMIN — TRAMADOL HYDROCHLORIDE 50 MG: 50 TABLET, FILM COATED ORAL at 15:10

## 2021-02-13 RX ADMIN — NYSTATIN: 100000 POWDER TOPICAL at 08:46

## 2021-02-13 RX ADMIN — Medication 10 ML: at 05:04

## 2021-02-13 RX ADMIN — SODIUM CHLORIDE 20 MILLION UNITS: 9 INJECTION, SOLUTION INTRAVENOUS at 15:02

## 2021-02-13 RX ADMIN — NYSTATIN: 100000 POWDER TOPICAL at 21:20

## 2021-02-13 RX ADMIN — NYSTATIN: 100000 POWDER TOPICAL at 16:00

## 2021-02-13 RX ADMIN — TRAMADOL HYDROCHLORIDE 50 MG: 50 TABLET, FILM COATED ORAL at 08:52

## 2021-02-13 RX ADMIN — Medication 10 ML: at 16:11

## 2021-02-13 RX ADMIN — SILVER SULFADIAZINE: 10 CREAM TOPICAL at 18:00

## 2021-02-13 RX ADMIN — Medication 10 ML: at 21:20

## 2021-02-13 RX ADMIN — ACETAMINOPHEN 650 MG: 325 TABLET ORAL at 13:06

## 2021-02-13 RX ADMIN — Medication 10 ML: at 15:13

## 2021-02-13 NOTE — PROGRESS NOTES
Brought ordered dose of Lasix 40 mg IV to patient bedside. Patient refusing dose, stating that she does not want to be up all night using the bathroom. Attempted to educate on importance of dose for her clinical improvement and comfort. Patient still refused and states that she will only take am dose.

## 2021-02-13 NOTE — PROGRESS NOTES
Hospitalist Progress Note       Name: Viraj Crespo MRN: [id number]  : 1975  Age:46 y.o.  female  Admit Date:  2021 LOS: 5    Reason for Admission:  Sepsis ([facility] [address] [A41.9]  Altered mental status [R41.82]  Amphetamine use disorder, mild ([facility] [address] [F15.10]    Hospital Course:  Please refer to the admission H&P for details of presentation. In summary, Viraj Crespo is a 55 y.o. homeless female with medical history significant  For polysubstance abuse(methamphetamine, cannabis) who presented via EMS wanting to detox from methamphetamine. She was noted to have multiple skin wounds all over her body in various stages. Noted to have temp of 101.5F, tachycardia of 115, Na 129, K 2.7, elevated LFTs, e;evated CRP. UA noted to be 3+ bacteria with 10-20 WBCs. Patient was started on broad spectrum antibiotics. BCx grew Strep Pyogenes and antibiotics were de-escalated. Infectious disease is on board to [facility] with antibiotics regimen. TTE without vegetations. Improvement in her cellulitis and skin lesions without any fever or worsening signs of infections. Right arm wound with small abscess but not a candidate for drainage due to location/depth. Subjective/24 hr Events (21) : Patient is seen and examined at bedside. No acute events reported overnight by nursing staff. Feeling better. Complains of some vaginal itching per RN. Patient denies fever, chills, chest pains, shortness of breath, n/v, abdominal pain. ROS: 10 point review of systems is otherwise negative with the exception of the elements mentioned above.     Objective:    Patient Vitals for the past 24 hrs:   Temp Pulse Resp BP SpO2   21 1245 98.6 °F (37 °C) 69 16 123/71 99 %   21 0720 98.8 °F (37.1 °C) 60 18 (!) 108/56 98 %   21 0545 98 °F (36.7 °C) 62 17 128/80 98 %   21 2312 97.7 °F (36.5 °C) 66 16 117/66 97 %   21 2032 98.8 °F (37.1 °C) 80 16 124/83 97 %   21 1610 98.2 °F (36.8 °C) 64 16 (!) 149/79 96 %     Oxygen Therapy  O2 Sat (%): 99 % (02/13/21 1245)  Pulse via Oximetry: 98 beats per minute (02/08/21 1840)  O2 Device: Room air (02/12/21 1534)    Body mass index is 45.5 kg/m². Physical Exam:   General:     alert, awake, no acute distress. Appears tired  Head:   normocephalic, atraumatic  Eyes, Ears, nose: PERRL, EOMI. Normal conjunctiva  Neck:    supple, non-tender. Trachea midline. Lungs:   CTAB, no wheezing, rhonchi, rales  Cardiac:   RRR, Normal S1 and S2. Abdomen:   Soft, non distended, nontender, +BS,  Extremities:   Warm, dry. Improved B/L LE erythema. +1 edema. Skin:   No rashes, no jaundice, multiple skin wounds through out and all appeared crusted. One lesion in right forearm appears to have tracking into the subQ. Neuro:  AAOx3.  No gross focal neurological deficit  Psychiatric:  No anxiety, calm, cooperative    Data Review:  I have reviewed all labs, meds, and studies from the last 24 hours:      Labs:    Recent Results (from the past 24 hour(s))   CBC W/O DIFF    Collection Time: 02/13/21  6:47 AM   Result Value Ref Range    WBC 5.5 4.3 - 11.1 K/uL    RBC 4.20 4.05 - 5.2 M/uL    HGB 12.3 11.7 - 15.4 g/dL    HCT 38.9 35.8 - 46.3 %    MCV 92.6 79.6 - 97.8 FL    MCH 29.3 26.1 - 32.9 PG    MCHC 31.6 31.4 - 35.0 g/dL    RDW 13.2 11.9 - 14.6 %    PLATELET 441 376 - 789 K/uL    MPV 9.9 9.4 - 12.3 FL    ABSOLUTE NRBC 0.00 0.0 - 0.2 K/uL   METABOLIC PANEL, BASIC    Collection Time: 02/13/21  6:47 AM   Result Value Ref Range    Sodium 141 136 - 145 mmol/L    Potassium 4.6 3.5 - 5.1 mmol/L    Chloride 110 (H) 98 - 107 mmol/L    CO2 27 21 - 32 mmol/L    Anion gap 4 (L) 7 - 16 mmol/L    Glucose 89 65 - 100 mg/dL    BUN 7 6 - 23 MG/DL    Creatinine 0.53 (L) 0.6 - 1.0 MG/DL    GFR est AA >60 >60 ml/min/1.73m2    GFR est non-AA >60 >60 ml/min/1.73m2    Calcium 9.1 8.3 - 10.4 MG/DL         All Micro Results     Procedure Component Value Units Date/Time    CULTURE, BLOOD [[id number]] Collected: 02/12/21 1016    Order Status: Completed Specimen: Blood Updated: 02/13/21 0703     Special Requests: --        LEFT  Antecubital       Culture result: NO GROWTH AFTER 19 HOURS       CULTURE, BLOOD [[id number]] Collected: 02/12/21 1023    Order Status: Completed Specimen: Blood Updated: 02/13/21 0703     Special Requests: --        RIGHT  HAND       Culture result: NO GROWTH AFTER 19 HOURS       CULTURE, BLOOD [[id number]]  (Abnormal)  (Susceptibility) Collected: 02/08/21 1252    Order Status: Completed Specimen: Blood Updated: 02/11/21 0702     Special Requests: --        LEFT  HAND       GRAM STAIN GRAM POS COCCI IN CHAINS         ANAEROBIC BOTTLE POSITIVE               RESULTS VERIFIED, PHONED TO AND READ BACK BY Bashir Tyler RN @7393 ON [id number] PF           Culture result:       STREPTOCOCCUS PYOGENES SERO GROUP A Reported to Dept of Health                  Refer to Blood Culture ID Panel Accession  [id number]      BLOOD CULTURE ID PANEL [[id number]]  (Abnormal) Collected: 02/08/21 1252    Order Status: Completed Specimen: Blood Updated: 02/09/21 0648     Acc. no. from Micro Order [id number]     Streptococcus Detected        Streptococcus pyogenes (Group A) Detected        Comment: RESULTS VERIFIED, PHONED TO AND READ BACK BY  Bashir Tyler RN @9352 ON [id number] [place]          INTERPRETATION       Gram Positive cocci, Identified by realtime PCR as  Streptococcus pyogenes (Group A Strep)          CULTURE, BLOOD [[id number]]     Order Status: Canceled Specimen: Blood           Current Meds:  Current Facility-Administered Medications   Medication Dose Route Frequency    silver sulfADIAZINE (SILVADENE) 1 % topical cream   Topical BID    furosemide (LASIX) injection 40 mg  40 mg IntraVENous DAILY    penicillin G potassium (PFIZERPEN) 20 Million Units in 0.9% sodium chloride 1,000 mL infusion  20 Million Units IntraVENous CONTINUOUS    traMADoL (ULTRAM) tablet 50 mg  50 mg Oral Q6H PRN    HYDROmorphone (PF) (DILAUDID) injection 0.5 mg  0.5 mg IntraVENous Q6H PRN    nystatin (MYCOSTATIN) 100,000 unit/gram powder   Topical TID    sodium chloride (NS) flush 5-40 mL  5-40 mL IntraVENous Q8H    sodium chloride (NS) flush 5-40 mL  5-40 mL IntraVENous PRN    acetaminophen (TYLENOL) tablet 650 mg  650 mg Oral Q6H PRN    Or    acetaminophen (TYLENOL) suppository 650 mg  650 mg Rectal Q6H PRN    polyethylene glycol (MIRALAX) packet 17 g  17 g Oral DAILY PRN    promethazine (PHENERGAN) tablet 12.5 mg  12.5 mg Oral Q6H PRN    Or    ondansetron (ZOFRAN) injection 4 mg  4 mg IntraVENous Q6H PRN    enoxaparin (LOVENOX) injection 40 mg  40 mg SubCUTAneous DAILY    zolpidem (AMBIEN) tablet 5 mg  5 mg Oral QHS PRN         Other Studies:  Ct Head Wo Cont    Result Date: 2/8/2021  CT of the head without contrast. CLINICAL INDICATION: Altered mental status PROCEDURE: Serial thin section axial images are obtained from the cranial vertex through the skull base without the administration of intravenous contrast. Radiation dose reduction techniques were used for this study. Our CT scanners use one or all of the following: Automated exposure control, adjusted of the mA and/or kV according to patient size, iterative reconstruction COMPARISON: Head CT dated 7/27/2016. FINDINGS: Images are degraded by motion. There is no acute intracranial hemorrhage, mass, or mass effect. No abnormal extra-axial fluid collections identified. There is no hydrocephalus. The basilar cisterns are widely patent. The gray-white matter brain parenchymal interface is well-maintained. No skull fracture or aggressive osseous lesion noted. The mastoid air cells and included paranasal sinuses are clear. Examination is limited by motion degradation. No gross acute intracranial abnormality. [facility]    Result Date: 2/9/2021  EXAM: Limited abdomen ultrasound. INDICATION: Elevated liver enzymes. COMPARISON: None.  TECHNIQUE: Standard protocol limited right upper quadrant abdomen ultrasound. FINDINGS: - Liver: Within normal limits. - Gallbladder: Post cholecystectomy. - Bile ducts: Within normal limits. The common bile duct measures 5 mm. - Pancreas: Within normal limits, although the tail was obscured by bowel gas. - Right kidney: Within normal limits. - Aorta and IVC: Within normal limits. - Portal vein: Within normal limits. - Other: No ascites. Unremarkable study in this postcholecystectomy patient. Assessment:    Hospital Problems as of 2/13/2021 Date Reviewed: 2/9/2021          Codes Class Noted - Resolved POA    Chronic hepatitis C without hepatic coma ([facility] ICD-10-CM: B18.2  ICD-9-CM: 070.54  2/13/2021 - Present Unknown        Cellulitis ICD-10-CM: L03.90  ICD-9-CM: 682.9  2/11/2021 - Present Yes        Skin abrasion ICD-10-CM: T14. 8XXA  ICD-9-CM: 919.0  2/11/2021 - Present Yes        Skin lesion due to intravenous drug abuse ([facility] ICD-10-CM: L98.9, F19.188  ICD-9-CM: 709.8, 305.90  2/11/2021 - Present Unknown        Bacteremia due to Streptococcus ICD-10-CM: R78.81, B95.5  ICD-9-CM: 790.7, 041.00  2/10/2021 - Present Yes        Acute metabolic encephalopathy LGY-12-GJ: G93.41  ICD-9-CM: 348.31  2/9/2021 - Present Yes        Hypokalemia ICD-10-CM: E87.6  ICD-9-CM: 276.8  2/9/2021 - Present Unknown        Hyponatremia ICD-10-CM: E87.1  ICD-9-CM: 276.1  2/9/2021 - Present Unknown        Hepatitis C antibody test positive ICD-10-CM: R76.8  ICD-9-CM: 795.79  2/9/2021 - Present         * (Principal) Sepsis ([facility] ICD-10-CM: A41.9  ICD-9-CM: 038.9, 995.91  2/8/2021 - Present Yes        Amphetamine use disorder, mild ([facility]) ICD-10-CM: F15.10  ICD-9-CM: 292.9, 305.70  2/8/2021 - Present Yes               Plan:    Sepsis secondary to soft tissue and skin infection, possible urinary tract infection  Strep Pyogenes Bacteremia  BCx with Strep Pyogenes. Doppler of LE neg for DVT. ECHO without obvious vegetations.    Rt Arm ultrasound with ~ 1.5 cm area of ill-defined edema, with the appearance of inflammatory or infectious phlegmon. No discrete abscess/drainable fluid collection is visualized. - Wound care consulted. - ID recommendations appreciated: Continue with penicillin for now. - Follow-up repeat cultures 2/12. - If repeat cultures negative then she can continue with p.o. amoxicillin 3 times daily for 14 days total therapy. LE edema  Doppler is negative for DVT. pBNP of 700. ECHO with normal EF and normal diastolic function  - lasix daily for now    Acute Metabolic Encephalopathy - resolved  Secondary to toxic metabolic encephalopathy, septic encephalopathy, electrolyte abnormalities dehydration, hyponatremia  CT head neg     Hyponatremia: resolved with IVF  Hypokalemia: replete and monitor       Elevated LFTs (improved)  Hep C Ab positive  Hep C Ab (+), HIV(-). Abd ultrasound unremarkable. She is aware of her HCV and has been referred to it for treatment. need to follow up at [employer] [facility] [employer]        Poly-substance abuse and social issues  Homelessness  - CM/SW consulted    Hypokalemia: replete and monitor     Diet:  DIET REGULAR  DVT PPx: lovenox  Code: Full Code    Dispo: needs help with discharge placement  Estimated Discharge: 24-48 hrs. Pending BCx    Labs/Imaging Reviewed. Plan discussed with staff, patient/family and are in agreement. Part of this note was written by using a voice dictation software and the note has been proof read but may still contain some grammatical/other typographical errors.     Signed By: Nathan Lin MD     February 13, 2021

## 2021-02-13 NOTE — PROGRESS NOTES
Problem: Falls - Risk of  Goal: *Absence of Falls  Description: Document Rivero Reason Fall Risk and appropriate interventions in the flowsheet. Outcome: Progressing Towards Goal  Note: Fall Risk Interventions:  Mobility Interventions: Bed/chair exit alarm, PT Consult for mobility concerns, Patient to call before getting OOB, PT Consult for assist device competence    Mentation Interventions: Adequate sleep, hydration, pain control    Medication Interventions: Bed/chair exit alarm    Elimination Interventions: Call light in reach, Elevated toilet seat, Patient to call for help with toileting needs              Problem: Patient Education: Go to Patient Education Activity  Goal: Patient/Family Education  Outcome: Progressing Towards Goal     Problem: Pressure Injury - Risk of  Goal: *Prevention of pressure injury  Description: Document Jim Scale and appropriate interventions in the flowsheet.   Outcome: Progressing Towards Goal  Note: Pressure Injury Interventions:  Sensory Interventions: Assess changes in LOC    Moisture Interventions: Absorbent underpads    Activity Interventions: Assess need for specialty bed, Chair cushion, Increase time out of bed, Pressure redistribution bed/mattress(bed type)    Mobility Interventions: Assess need for specialty bed    Nutrition Interventions: Document food/fluid/supplement intake    Friction and Shear Interventions: Minimize layers, Lift sheet                Problem: Patient Education: Go to Patient Education Activity  Goal: Patient/Family Education  Outcome: Progressing Towards Goal     Problem: Patient Education: Go to Patient Education Activity  Goal: Patient/Family Education  Outcome: Progressing Towards Goal     Problem: Patient Education: Go to Patient Education Activity  Goal: Patient/Family Education  Outcome: Progressing Towards Goal

## 2021-02-13 NOTE — PROGRESS NOTES
Hospitalist Progress Note       Name: Felicita Hinson MRN: [id number]  : 1975  Age:46 y.o.  female  Admit Date:  2021 LOS: 4    Reason for Admission:  Sepsis ([facility] [A41.9]  Altered mental status [R41.82]  Amphetamine use disorder, mild ([facility] [F15.10]    Hospital Course:  Please refer to the admission H&P for details of presentation. In summary, Felicita Hinson is a 55 y.o. homeless female with medical history significant  For polysubstance abuse(methamphetamine, cannabis) who presented via EMS wanting to detox from methamphetamine. She was noted to have multiple skin wounds all over her body in various stages. Noted to have temp of 101.5F, tachycardia of 115, Na 129, K 2.7, elevated LFTs, e;evated CRP. UA noted to be 3+ bacteria with 10-20 WBCs. Patient was started on broad spectrum antibiotics. BCx grew Strep Pyogenes and antibiotics were de-escalated. Subjective/24 hr Events (21) : Patient is seen and examined at bedside. No acute events reported overnight by nursing staff. Reports improvement in her fatigue. Patient denies fever, chills, chest pains, shortness of breath, n/v, abdominal pain. She is eager to go home. ROS: 10 point review of systems is otherwise negative with the exception of the elements mentioned above. Objective:    Patient Vitals for the past 24 hrs:   Temp Pulse Resp BP SpO2   21 1610 98.2 °F (36.8 °C) 64 16 (!) 149/79 96 %   21 1148 98.3 °F (36.8 °C) 74 16 133/76 98 %   21 0800 98 °F (36.7 °C) 69 16 116/69 97 %   21 0042 98.7 °F (37.1 °C) 74 16 130/84 94 %   21 98.3 °F (36.8 °C) 73 16 113/66 96 %     Oxygen Therapy  O2 Sat (%): 96 % (21 1610)  Pulse via Oximetry: 98 beats per minute (21 1840)  O2 Device: Room air (21 8196)    Body mass index is 45.5 kg/m². Physical Exam:   General:     alert, awake, no acute distress.  Appears tired  Head:   normocephalic, atraumatic  Eyes, Ears, nose: PERRL, EOMI. Normal conjunctiva  Neck:    supple, non-tender. Trachea midline. Lungs:   CTAB, no wheezing, rhonchi, rales  Cardiac:   RRR, Normal S1 and S2. Abdomen:   Soft, non distended, nontender, +BS,  Extremities:   Warm, dry. Improved B/L LE erythema. +2 edema. Skin:   No rashes, no jaundice, multiple skin wounds through out and all appeared crusted. One lesion in right forearm appears to have fistula into the subQ. Neuro:  AAOx3.  No gross focal neurological deficit  Psychiatric:  No anxiety, calm, cooperative    Data Review:  I have reviewed all labs, meds, and studies from the last 24 hours:      Labs:    Recent Results (from the past 24 hour(s))   CBC W/O DIFF    Collection Time: 02/12/21  5:54 AM   Result Value Ref Range    WBC 4.4 4.3 - 11.1 K/uL    RBC 3.80 (L) 4.05 - 5.2 M/uL    HGB 11.1 (L) 11.7 - 15.4 g/dL    HCT 34.9 (L) 35.8 - 46.3 %    MCV 91.8 79.6 - 97.8 FL    MCH 29.2 26.1 - 32.9 PG    MCHC 31.8 31.4 - 35.0 g/dL    RDW 13.4 11.9 - 14.6 %    PLATELET 570 731 - 023 K/uL    MPV 10.1 9.4 - 12.3 FL    ABSOLUTE NRBC 0.00 0.0 - 0.2 K/uL   METABOLIC PANEL, BASIC    Collection Time: 02/12/21  5:54 AM   Result Value Ref Range    Sodium 143 136 - 145 mmol/L    Potassium 3.9 3.5 - 5.1 mmol/L    Chloride 109 (H) 98 - 107 mmol/L    CO2 29 21 - 32 mmol/L    Anion gap 5 (L) 7 - 16 mmol/L    Glucose 94 65 - 100 mg/dL    BUN 5 (L) 6 - 23 MG/DL    Creatinine 0.50 (L) 0.6 - 1.0 MG/DL    GFR est AA >60 >60 ml/min/1.73m2    GFR est non-AA >60 >60 ml/min/1.73m2    Calcium 8.5 8.3 - 10.4 MG/DL         All Micro Results     Procedure Component Value Units Date/Time    CULTURE, BLOOD [[id number]] Collected: 02/12/21 1023    Order Status: Completed Specimen: Blood Updated: 02/12/21 1116    CULTURE, BLOOD [[id number]] Collected: 02/12/21 1016    Order Status: Completed Specimen: Blood Updated: 02/12/21 1116    CULTURE, BLOOD [[id number]]  (Abnormal)  (Susceptibility) Collected: 02/08/21 1252    Order Status: Completed Specimen: Blood Updated: 02/11/21 0702     Special Requests: --        LEFT  HAND       GRAM STAIN GRAM POS COCCI IN CHAINS         ANAEROBIC BOTTLE POSITIVE               RESULTS VERIFIED, PHONED TO AND READ BACK BY Sharda Wu RN @8550 ON [id number] [place]           Culture result:       STREPTOCOCCUS PYOGENES SERO GROUP A Reported to Dept of Health                  Refer to Blood Culture ID Panel Accession  [id number]      BLOOD CULTURE ID PANEL [[id number]]  (Abnormal) Collected: 02/08/21 1252    Order Status: Completed Specimen: Blood Updated: 02/09/21 0648     Acc. no. from Micro Order [id number]     Streptococcus Detected        Streptococcus pyogenes (Group A) Detected        Comment: RESULTS VERIFIED, PHONED TO AND READ BACK BY  Sharda Wu RN @2619 ON [id number] [place]          INTERPRETATION       Gram Positive cocci, Identified by realtime PCR as  Streptococcus pyogenes (Group A Strep)          CULTURE, BLOOD [[id number]]     Order Status: Canceled Specimen: Blood           Current Meds:  Current Facility-Administered Medications   Medication Dose Route Frequency    silver sulfADIAZINE (SILVADENE) 1 % topical cream   Topical BID    penicillin G potassium (PFIZERPEN) 20 Million Units in 0.9% sodium chloride 1,000 mL infusion  20 Million Units IntraVENous CONTINUOUS    traMADoL (ULTRAM) tablet 50 mg  50 mg Oral Q6H PRN    HYDROmorphone (PF) (DILAUDID) injection 0.5 mg  0.5 mg IntraVENous Q6H PRN    nystatin (MYCOSTATIN) 100,000 unit/gram powder   Topical TID    sodium chloride (NS) flush 5-40 mL  5-40 mL IntraVENous Q8H    sodium chloride (NS) flush 5-40 mL  5-40 mL IntraVENous PRN    acetaminophen (TYLENOL) tablet 650 mg  650 mg Oral Q6H PRN    Or    acetaminophen (TYLENOL) suppository 650 mg  650 mg Rectal Q6H PRN    polyethylene glycol (MIRALAX) packet 17 g  17 g Oral DAILY PRN    promethazine (PHENERGAN) tablet 12.5 mg  12.5 mg Oral Q6H PRN    Or    ondansetron (ZOFRAN) injection 4 mg  4 mg IntraVENous Q6H PRN    enoxaparin (LOVENOX) injection 40 mg  40 mg SubCUTAneous DAILY    zolpidem (AMBIEN) tablet 5 mg  5 mg Oral QHS PRN         Other Studies:  Ct Head Wo Cont    Result Date: 2/8/2021  CT of the head without contrast. CLINICAL INDICATION: Altered mental status PROCEDURE: Serial thin section axial images are obtained from the cranial vertex through the skull base without the administration of intravenous contrast. Radiation dose reduction techniques were used for this study. Our CT scanners use one or all of the following: Automated exposure control, adjusted of the mA and/or kV according to patient size, iterative reconstruction COMPARISON: Head CT dated 7/27/2016. FINDINGS: Images are degraded by motion. There is no acute intracranial hemorrhage, mass, or mass effect. No abnormal extra-axial fluid collections identified. There is no hydrocephalus. The basilar cisterns are widely patent. The gray-white matter brain parenchymal interface is well-maintained. No skull fracture or aggressive osseous lesion noted. The mastoid air cells and included paranasal sinuses are clear. Examination is limited by motion degradation. No gross acute intracranial abnormality. [address] [facility]    Result Date: 2/9/2021  EXAM: Limited abdomen ultrasound. INDICATION: Elevated liver enzymes. COMPARISON: None. TECHNIQUE: Standard protocol limited right upper quadrant abdomen ultrasound. FINDINGS: - Liver: Within normal limits. - Gallbladder: Post cholecystectomy. - Bile ducts: Within normal limits. The common bile duct measures 5 mm. - Pancreas: Within normal limits, although the tail was obscured by bowel gas. - Right kidney: Within normal limits. - Aorta and IVC: Within normal limits. - Portal vein: Within normal limits. - Other: No ascites. Unremarkable study in this postcholecystectomy patient.          Assessment:    Hospital Problems as of 2/12/2021 Date Reviewed: 2/9/2021          Codes Class Noted - Resolved POA Cellulitis ICD-10-CM: L03.90  ICD-9-CM: 682.9  2/11/2021 - Present Yes        Skin abrasion ICD-10-CM: T14. 8XXA  ICD-9-CM: 919.0  2/11/2021 - Present Yes        Skin lesion due to intravenous drug abuse ([facility] ICD-10-CM: L98.9, F19.188  ICD-9-CM: 709.8, 305.90  2/11/2021 - Present Unknown        Bacteremia due to Streptococcus ICD-10-CM: R78.81, B95.5  ICD-9-CM: 790.7, 041.00  2/10/2021 - Present Yes        Acute metabolic encephalopathy [facility]: G93.41  ICD-9-CM: 348.31  2/9/2021 - Present Yes        Hypokalemia ICD-10-CM: E87.6  ICD-9-CM: 276.8  2/9/2021 - Present Unknown        Hyponatremia ICD-10-CM: E87.1  ICD-9-CM: 276.1  2/9/2021 - Present Unknown        Hepatitis C antibody test positive ICD-10-CM: R76.8  ICD-9-CM: 795.79  2/9/2021 - Present Yes        * (Principal) Sepsis ([facility] ICD-10-CM: A41.9  ICD-9-CM: 038.9, 995.91  2/8/2021 - Present Yes        Amphetamine use disorder, mild (HCC) ICD-10-CM: F15.10  ICD-9-CM: 292.9, 305.70  2/8/2021 - Present Yes               Plan:    Sepsis secondary to soft tissue and skin infection, possible urinary tract infection  Strep Pyogenes Bacteremia  BCx with Strep Pyogenes. Doppler of LE neg for DVT. ECHO without obvious vegetations. Rt Arm ultrasound with ~ 1.5 cm area of ill-defined edema, with the appearance of inflammatory or infectious phlegmon. No discrete abscess/drainable fluid collection is visualized. - Wound care consulted. - ID recommendations appreciated: Continue with penicillin for now. -Follow-up repeat cultures today. -If repeat cultures negative then she can continue with p.o. amoxicillin 3 times daily for 14 days total therapy. LE edema  Doppler is negative for DVT. pBNP of 700.   ECHO with normal EF and normal diastolic function  - lasix daily for now    Acute Metabolic Encephalopathy - resolved  Secondary to toxic metabolic encephalopathy, septic encephalopathy, electrolyte abnormalities dehydration, hyponatremia  CT head neg     Hyponatremia: resolved with IVF  Hypokalemia: replete and monitor       Elevated LFTs (improved)  Hep C Ab positive  Hep C Ab (+), HIV(-). Abd ultrasound unremarkable. She is aware of her HCV and has been referred to it for treatment. Hep C QT PCR       Poly-substance abuse and social issues  Homelessness  - CM/SW consulted    Hypokalemia: replete and monitor     Diet:  DIET REGULAR  DVT PPx: lovenox  Code: Full Code    Dispo: pending clinical course and PT/OT Eval  Estimated Discharge: TBD based on clinical course      Labs/Imaging Reviewed. Plan discussed with staff, patient/family and are in agreement. Part of this note was written by using a voice dictation software and the note has been proof read but may still contain some grammatical/other typographical errors.     Signed By: Tomas Wagner MD     February 12, 2021

## 2021-02-14 LAB
ANION GAP SERPL CALC-SCNC: 5 MMOL/L (ref 7–16)
BUN SERPL-MCNC: 8 MG/DL (ref 6–23)
CALCIUM SERPL-MCNC: 9.2 MG/DL (ref 8.3–10.4)
CHLORIDE SERPL-SCNC: 108 MMOL/L (ref 98–107)
CO2 SERPL-SCNC: 27 MMOL/L (ref 21–32)
CREAT SERPL-MCNC: 0.51 MG/DL (ref 0.6–1)
ERYTHROCYTE [DISTWIDTH] IN BLOOD BY AUTOMATED COUNT: 13.3 % (ref 11.9–14.6)
GLUCOSE SERPL-MCNC: 90 MG/DL (ref 65–100)
HCT VFR BLD AUTO: 38.2 % (ref 35.8–46.3)
HGB BLD-MCNC: 12.3 G/DL (ref 11.7–15.4)
MCH RBC QN AUTO: 29.4 PG (ref 26.1–32.9)
MCHC RBC AUTO-ENTMCNC: 32.2 G/DL (ref 31.4–35)
MCV RBC AUTO: 91.2 FL (ref 79.6–97.8)
NRBC # BLD: 0 K/UL (ref 0–0.2)
PLATELET # BLD AUTO: 238 K/UL (ref 150–450)
PMV BLD AUTO: 9.6 FL (ref 9.4–12.3)
POTASSIUM SERPL-SCNC: 4.4 MMOL/L (ref 3.5–5.1)
RBC # BLD AUTO: 4.19 M/UL (ref 4.05–5.2)
SODIUM SERPL-SCNC: 140 MMOL/L (ref 136–145)
WBC # BLD AUTO: 5.6 K/UL (ref 4.3–11.1)

## 2021-02-14 PROCEDURE — 74011250636 HC RX REV CODE- 250/636: Performed by: HOSPITALIST

## 2021-02-14 PROCEDURE — 80048 BASIC METABOLIC PNL TOTAL CA: CPT

## 2021-02-14 PROCEDURE — 74011250637 HC RX REV CODE- 250/637: Performed by: HOSPITALIST

## 2021-02-14 PROCEDURE — 65270000029 HC RM PRIVATE

## 2021-02-14 PROCEDURE — 36415 COLL VENOUS BLD VENIPUNCTURE: CPT

## 2021-02-14 PROCEDURE — 74011250636 HC RX REV CODE- 250/636: Performed by: INTERNAL MEDICINE

## 2021-02-14 PROCEDURE — 85027 COMPLETE CBC AUTOMATED: CPT

## 2021-02-14 PROCEDURE — 74011250637 HC RX REV CODE- 250/637: Performed by: INTERNAL MEDICINE

## 2021-02-14 RX ORDER — AMOXICILLIN 500 MG/1
1000 CAPSULE ORAL EVERY 8 HOURS
Status: DISCONTINUED | OUTPATIENT
Start: 2021-02-14 | End: 2021-02-15 | Stop reason: HOSPADM

## 2021-02-14 RX ORDER — TRAMADOL HYDROCHLORIDE 50 MG/1
50 TABLET ORAL
Qty: 12 TAB | Refills: 0 | Status: SHIPPED | OUTPATIENT
Start: 2021-02-14 | End: 2021-02-17

## 2021-02-14 RX ORDER — AMOXICILLIN 500 MG/1
1000 CAPSULE ORAL EVERY 8 HOURS
Qty: 84 CAP | Refills: 0 | Status: SHIPPED | OUTPATIENT
Start: 2021-02-14 | End: 2021-02-28

## 2021-02-14 RX ORDER — SILVER SULFADIAZINE 10 G/1000G
CREAM TOPICAL 2 TIMES DAILY
Qty: 50 G | Refills: 0 | Status: SHIPPED | OUTPATIENT
Start: 2021-02-14

## 2021-02-14 RX ORDER — NYSTATIN 100000 [USP'U]/G
POWDER TOPICAL 3 TIMES DAILY
Qty: 30 G | Refills: 0 | Status: SHIPPED | OUTPATIENT
Start: 2021-02-14

## 2021-02-14 RX ADMIN — NYSTATIN: 100000 POWDER TOPICAL at 19:47

## 2021-02-14 RX ADMIN — TRAMADOL HYDROCHLORIDE 50 MG: 50 TABLET, FILM COATED ORAL at 15:17

## 2021-02-14 RX ADMIN — Medication 10 ML: at 05:03

## 2021-02-14 RX ADMIN — ENOXAPARIN SODIUM 40 MG: 100 INJECTION SUBCUTANEOUS at 08:26

## 2021-02-14 RX ADMIN — SILVER SULFADIAZINE: 10 CREAM TOPICAL at 09:00

## 2021-02-14 RX ADMIN — ZOLPIDEM TARTRATE 5 MG: 5 TABLET, COATED ORAL at 19:45

## 2021-02-14 RX ADMIN — FUROSEMIDE 40 MG: 10 INJECTION, SOLUTION INTRAMUSCULAR; INTRAVENOUS at 08:26

## 2021-02-14 RX ADMIN — Medication 10 ML: at 14:05

## 2021-02-14 RX ADMIN — AMOXICILLIN 1000 MG: 500 CAPSULE ORAL at 12:09

## 2021-02-14 RX ADMIN — SILVER SULFADIAZINE: 10 CREAM TOPICAL at 18:00

## 2021-02-14 RX ADMIN — Medication 10 ML: at 19:47

## 2021-02-14 RX ADMIN — AMOXICILLIN 1000 MG: 500 CAPSULE ORAL at 19:46

## 2021-02-14 RX ADMIN — HYDROMORPHONE HYDROCHLORIDE 0.5 MG: 1 INJECTION, SOLUTION INTRAMUSCULAR; INTRAVENOUS; SUBCUTANEOUS at 18:00

## 2021-02-14 RX ADMIN — NYSTATIN: 100000 POWDER TOPICAL at 15:17

## 2021-02-14 RX ADMIN — NYSTATIN: 100000 POWDER TOPICAL at 08:27

## 2021-02-14 NOTE — PROGRESS NOTES
Hospitalist Progress Note       Name: Mary Almanzar MRN: [id number]  : 1975  Age:46 y.o.  female  Admit Date:  2021 LOS: 6    Reason for Admission:  Sepsis ([facility] [address] [A41.9]  Altered mental status [R41.82]  Amphetamine use disorder, mild ([facility] [F15.10]    Hospital Course:  Please refer to the admission H&P for details of presentation. In summary, Mary Almanzar is a 55 y.o. homeless female with medical history significant  For polysubstance abuse(methamphetamine, cannabis) who presented via EMS wanting to detox from methamphetamine. She was noted to have multiple skin wounds all over her body in various stages. Noted to have temp of 101.5F, tachycardia of 115, Na 129, K 2.7, elevated LFTs, e;evated CRP. UA noted to be 3+ bacteria with 10-20 WBCs. Patient was started on broad spectrum antibiotics. BCx grew Strep Pyogenes and antibiotics were de-escalated. Infectious disease is on board to [facility] with antibiotics regimen. TTE without vegetations. Improvement in her cellulitis and skin lesions without any fever or worsening signs of infections. Right arm wound with small abscess but not a candidate for drainage due to location/depth. Subjective/24 hr Events (21) :  Patient is seen and examined at bedside. No acute events reported overnight by nursing staff. Feeling better. Some chills with cold sweats this AM. No recorded febrile episodes. Patient denies fever, chest pains, shortness of breath, n/v, abdominal pain. ROS: 10 point review of systems is otherwise negative with the exception of the elements mentioned above.     Objective:    Patient Vitals for the past 24 hrs:   Temp Pulse Resp BP SpO2   21 1251 98.7 °F (37.1 °C) 76 18 (!) 141/72 98 %   21 0837 98 °F (36.7 °C) 73 22 111/70 95 %   21 0357 98.2 °F (36.8 °C) 70 18 128/65 94 %   21 2000 97.8 °F (36.6 °C) 62 16 121/66 97 %   21 1800 98.3 °F (36.8 °C) 62 16 (!) 97/57 99 %     Oxygen Therapy  O2 Sat (%): 98 % (02/14/21 1251)  Pulse via Oximetry: 62 beats per minute (02/13/21 2000)  O2 Device: Room air (02/13/21 2000)    Body mass index is 45.5 kg/m². Physical Exam:   General:     alert, awake, no acute distress. Appears tired  Head:   normocephalic, atraumatic  Eyes, Ears, nose: PERRL, EOMI. Normal conjunctiva  Neck:    supple, non-tender. Trachea midline. Lungs:   CTAB, no wheezing, rhonchi, rales  Cardiac:   RRR, Normal S1 and S2. Abdomen:   Soft, non distended, nontender, +BS,  Extremities:   Warm, dry. Improved B/L LE erythema. +1 edema. Skin:   No rashes, no jaundice, multiple skin wounds through out and all appeared crusted. One lesion in right forearm appears to have tracking into the subQ. Neuro:  AAOx3.  No gross focal neurological deficit  Psychiatric:  No anxiety, calm, cooperative    Data Review:  I have reviewed all labs, meds, and studies from the last 24 hours:      Labs:    Recent Results (from the past 24 hour(s))   CBC W/O DIFF    Collection Time: 02/14/21  5:15 AM   Result Value Ref Range    WBC 5.6 4.3 - 11.1 K/uL    RBC 4.19 4.05 - 5.2 M/uL    HGB 12.3 11.7 - 15.4 g/dL    HCT 38.2 35.8 - 46.3 %    MCV 91.2 79.6 - 97.8 FL    MCH 29.4 26.1 - 32.9 PG    MCHC 32.2 31.4 - 35.0 g/dL    RDW 13.3 11.9 - 14.6 %    PLATELET 473 685 - 925 K/uL    MPV 9.6 9.4 - 12.3 FL    ABSOLUTE NRBC 0.00 0.0 - 0.2 K/uL   METABOLIC PANEL, BASIC    Collection Time: 02/14/21  5:15 AM   Result Value Ref Range    Sodium 140 136 - 145 mmol/L    Potassium 4.4 3.5 - 5.1 mmol/L    Chloride 108 (H) 98 - 107 mmol/L    CO2 27 21 - 32 mmol/L    Anion gap 5 (L) 7 - 16 mmol/L    Glucose 90 65 - 100 mg/dL    BUN 8 6 - 23 MG/DL    Creatinine 0.51 (L) 0.6 - 1.0 MG/DL    GFR est AA >60 >60 ml/min/1.73m2    GFR est non-AA >60 >60 ml/min/1.73m2    Calcium 9.2 8.3 - 10.4 MG/DL         All Micro Results     Procedure Component Value Units Date/Time    CULTURE, BLOOD [[id number]] Collected: 02/12/21 1016    Order Status: Completed Specimen: Blood Updated: 02/14/21 4046     Special Requests: --        LEFT  Antecubital       Culture result: NO GROWTH 2 DAYS       CULTURE, BLOOD [[id number]] Collected: 02/12/21 1023    Order Status: Completed Specimen: Blood Updated: 02/14/21 0633     Special Requests: --        RIGHT  HAND       Culture result: NO GROWTH 2 DAYS       CULTURE, BLOOD [[id number]]  (Abnormal)  (Susceptibility) Collected: 02/08/21 1252    Order Status: Completed Specimen: Blood Updated: 02/11/21 0702     Special Requests: --        LEFT  HAND       GRAM STAIN GRAM POS COCCI IN CHAINS         ANAEROBIC BOTTLE POSITIVE               RESULTS VERIFIED, PHONED TO AND READ BACK BY Frederick Mckeon RN @6766 ON [id number] XK           Culture result:       STREPTOCOCCUS PYOGENES SERO GROUP A Reported to Dept of Health                  Refer to Blood Culture ID Panel Accession  [id number]      BLOOD CULTURE ID PANEL [[id number]]  (Abnormal) Collected: 02/08/21 1252    Order Status: Completed Specimen: Blood Updated: 02/09/21 0648     Acc. no. from Micro Order [id number]     Streptococcus Detected        Streptococcus pyogenes (Group A) Detected        Comment: RESULTS VERIFIED, PHONED TO AND READ BACK BY  Frederick Mckeon RN @0405 ON [id number] [place]          INTERPRETATION       Gram Positive cocci, Identified by realtime PCR as  Streptococcus pyogenes (Group A Strep)          CULTURE, BLOOD [[id number]]     Order Status: Canceled Specimen: Blood           Current Meds:  Current Facility-Administered Medications   Medication Dose Route Frequency    amoxicillin (AMOXIL) capsule 1,000 mg  1,000 mg Oral Q8H    silver sulfADIAZINE (SILVADENE) 1 % topical cream   Topical BID    furosemide (LASIX) injection 40 mg  40 mg IntraVENous DAILY    traMADoL (ULTRAM) tablet 50 mg  50 mg Oral Q6H PRN    HYDROmorphone (PF) (DILAUDID) injection 0.5 mg  0.5 mg IntraVENous Q6H PRN    nystatin (MYCOSTATIN) 100,000 unit/gram powder   Topical TID    sodium chloride (NS) flush 5-40 mL  5-40 mL IntraVENous Q8H    sodium chloride (NS) flush 5-40 mL  5-40 mL IntraVENous PRN    acetaminophen (TYLENOL) tablet 650 mg  650 mg Oral Q6H PRN    Or    acetaminophen (TYLENOL) suppository 650 mg  650 mg Rectal Q6H PRN    polyethylene glycol (MIRALAX) packet 17 g  17 g Oral DAILY PRN    promethazine (PHENERGAN) tablet 12.5 mg  12.5 mg Oral Q6H PRN    Or    ondansetron (ZOFRAN) injection 4 mg  4 mg IntraVENous Q6H PRN    enoxaparin (LOVENOX) injection 40 mg  40 mg SubCUTAneous DAILY    zolpidem (AMBIEN) tablet 5 mg  5 mg Oral QHS PRN         Other Studies:  Ct Head Wo Cont    Result Date: 2/8/2021  CT of the head without contrast. CLINICAL INDICATION: Altered mental status PROCEDURE: Serial thin section axial images are obtained from the cranial vertex through the skull base without the administration of intravenous contrast. Radiation dose reduction techniques were used for this study. Our CT scanners use one or all of the following: Automated exposure control, adjusted of the mA and/or kV according to patient size, iterative reconstruction COMPARISON: Head CT dated 7/27/2016. FINDINGS: Images are degraded by motion. There is no acute intracranial hemorrhage, mass, or mass effect. No abnormal extra-axial fluid collections identified. There is no hydrocephalus. The basilar cisterns are widely patent. The gray-white matter brain parenchymal interface is well-maintained. No skull fracture or aggressive osseous lesion noted. The mastoid air cells and included paranasal sinuses are clear. Examination is limited by motion degradation. No gross acute intracranial abnormality. [address] [facility]    Result Date: 2/9/2021  EXAM: Limited abdomen ultrasound. INDICATION: Elevated liver enzymes. COMPARISON: None. TECHNIQUE: Standard protocol limited right upper quadrant abdomen ultrasound. FINDINGS: - Liver: Within normal limits. - Gallbladder: Post cholecystectomy. - Bile ducts: Within normal limits. The common bile duct measures 5 mm. - Pancreas: Within normal limits, although the tail was obscured by bowel gas. - Right kidney: Within normal limits. - Aorta and IVC: Within normal limits. - Portal vein: Within normal limits. - Other: No ascites. Unremarkable study in this postcholecystectomy patient. Assessment:    Hospital Problems as of 2/14/2021 Date Reviewed: 2/9/2021          Codes Class Noted - Resolved POA    Chronic hepatitis C without hepatic coma ([facility] ICD-10-CM: B18.2  ICD-9-CM: 070.54  2/13/2021 - Present Unknown        Cellulitis ICD-10-CM: L03.90  ICD-9-CM: 682.9  2/11/2021 - Present Yes        Skin abrasion ICD-10-CM: T14. 8XXA  ICD-9-CM: 919.0  2/11/2021 - Present Yes        Skin lesion due to intravenous drug abuse ([facility] ICD-10-CM: L98.9, F19.188  ICD-9-CM: 709.8, 305.90  2/11/2021 - Present Unknown        Bacteremia due to Streptococcus ICD-10-CM: R78.81, B95.5  ICD-9-CM: 790.7, 041.00  2/10/2021 - Present Yes        Acute metabolic encephalopathy [facility]: G93.41  ICD-9-CM: 348.31  2/9/2021 - Present Yes        Hypokalemia ICD-10-CM: E87.6  ICD-9-CM: 276.8  2/9/2021 - Present Unknown        Hyponatremia ICD-10-CM: E87.1  ICD-9-CM: 276.1  2/9/2021 - Present Unknown        Hepatitis C antibody test positive ICD-10-CM: R76.8  ICD-9-CM: 795.79  2/9/2021 - Present         * (Principal) Sepsis ([facility] ICD-10-CM: A41.9  ICD-9-CM: 038.9, 995.91  2/8/2021 - Present Yes        Amphetamine use disorder, mild (HCC) ICD-10-CM: F15.10  ICD-9-CM: 292.9, 305.70  2/8/2021 - Present Yes               Plan:    Sepsis secondary to soft tissue and skin infection, possible urinary tract infection  Strep Pyogenes Bacteremia  BCx with Strep Pyogenes. Doppler of LE neg for DVT. ECHO without obvious vegetations. Rt Arm ultrasound with ~ 1.5 cm area of ill-defined edema, with the appearance of inflammatory or infectious phlegmon. No discrete abscess/drainable fluid collection is visualized.   - Wound care consulted. - ID recommendations appreciated: switch to PO amoxicillin given BCx neg x 2 days  - Follow-up repeat cultures 2/12. LE edema  Doppler is negative for DVT. pBNP of 700. ECHO with normal EF and normal diastolic function  - lasix daily for now    Acute Metabolic Encephalopathy - resolved  Secondary to toxic metabolic encephalopathy, septic encephalopathy, electrolyte abnormalities dehydration, hyponatremia  CT head neg     Hyponatremia: resolved with IVF  Hypokalemia: replete and monitor       Elevated LFTs (improved)  Hep C Ab positive  Hep C Ab (+), HIV(-). Abd ultrasound unremarkable. She is aware of her HCV and has been referred to it for treatment. need to follow up at [employer] [facility] [employer]        Poly-substance abuse and social issues  Homelessness  - CM/SW consulted    Hypokalemia: replete and monitor     Diet:  DIET REGULAR  DVT PPx: lovenox  Code: Full Code    Dispo: needs help with discharge placement  Estimated Discharge: will monitor today as transition to PO abx today. Labs/Imaging Reviewed. Plan discussed with staff, patient/family and are in agreement. Part of this note was written by using a voice dictation software and the note has been proof read but may still contain some grammatical/other typographical errors.     Signed By: Jose Carrizales MD     February 14, 2021

## 2021-02-14 NOTE — PROGRESS NOTES
Pt to d/c today. Pt is homeless. Contacted multiple local homeless shelters and none accepting pt today. Pt will require assistance with medication. Will fax voucher to [facility] on [facility]. Met with pt at bedside. Gave pt information and resources for [employer] to establish PCP. Pt aware of importance of follow up. Pt given information on two local homeless shelters that may have opening tomorrow. Pt provided with number and time to call. Pt also give resources for NA classes and [facility] to assist with sobriety from drugs. Also, provided lists of resources for homeless assistance. Pt aware she can get medications prescribed at [facility] on [facility] with voucher CM faxed to [facility].     Pt reports prior to admission pt in [facility] and recently transitioned to [facility]. Pt would like to  belongings but knows she cannot return as she relapsed on drugs. Pt plans to call daughter but know she may not be able to stay with daughter, even overnight. Transport to be provided, [facility], to [facility], [facility] to get belongings, and location of pt choice within 15 miles. CM available if any new needs arise. Update: Hospitalist aware pt will not be able to get into homeless shelter today and plans to keep pt overnight. Hospitalist aware this maybe the same outcome tomorrow as well but still wants to keep overnight. Will follow up with homeless shelters in the am.     Care Management Interventions  PCP Verified by CM: No(Information given for [facility])  Mode of Transport at Discharge: Other (see comment)(Round Trip )  Transition of Care Consult (CM Consult): Discharge Planning, Other(Homeless)  Discharge Durable Medical Equipment: No  Physical Therapy Consult: Yes  Occupational Therapy Consult: Yes  Speech Therapy Consult: No  Current Support Network:  Other(Homeless)  Confirm Follow Up Transport: Self  The Plan for Transition of Care is Related to the Following Treatment Goals : Continue to be drug free and establish PCP  The Patient and/or Patient Representative was Provided with a Choice of Provider and Agrees with the Discharge Plan?: Yes  Name of the Patient Representative Who was Provided with a Choice of Provider and Agrees with the Discharge Plan: pt   Freedom of Choice List was Provided with Basic Dialogue that Supports the Patient's Individualized Plan of Care/Goals, Treatment Preferences and Shares the Quality Data Associated with the Providers?: Yes  [employer] Information Provided?: No  Discharge Location  Discharge Placement: Homeless

## 2021-02-15 VITALS
RESPIRATION RATE: 16 BRPM | BODY MASS INDEX: 45.55 KG/M2 | SYSTOLIC BLOOD PRESSURE: 112 MMHG | DIASTOLIC BLOOD PRESSURE: 61 MMHG | HEIGHT: 65 IN | HEART RATE: 69 BPM | TEMPERATURE: 98.4 F | WEIGHT: 273.4 LBS | OXYGEN SATURATION: 98 %

## 2021-02-15 PROBLEM — E87.6 HYPOKALEMIA: Status: RESOLVED | Noted: 2021-02-09 | Resolved: 2021-02-15

## 2021-02-15 PROBLEM — A41.9 SEPSIS (HCC): Status: RESOLVED | Noted: 2021-02-08 | Resolved: 2021-02-15

## 2021-02-15 PROBLEM — E66.01 CLASS 3 SEVERE OBESITY WITH BODY MASS INDEX (BMI) OF 45.0 TO 49.9 IN ADULT (HCC): Status: ACTIVE | Noted: 2021-02-15

## 2021-02-15 PROBLEM — E87.1 HYPONATREMIA: Status: RESOLVED | Noted: 2021-02-09 | Resolved: 2021-02-15

## 2021-02-15 PROBLEM — L03.90 CELLULITIS: Status: RESOLVED | Noted: 2021-02-11 | Resolved: 2021-02-15

## 2021-02-15 LAB
ANION GAP SERPL CALC-SCNC: 3 MMOL/L (ref 7–16)
BUN SERPL-MCNC: 13 MG/DL (ref 6–23)
CALCIUM SERPL-MCNC: 9 MG/DL (ref 8.3–10.4)
CHLORIDE SERPL-SCNC: 107 MMOL/L (ref 98–107)
CO2 SERPL-SCNC: 29 MMOL/L (ref 21–32)
CREAT SERPL-MCNC: 0.58 MG/DL (ref 0.6–1)
ERYTHROCYTE [DISTWIDTH] IN BLOOD BY AUTOMATED COUNT: 13.2 % (ref 11.9–14.6)
GLUCOSE SERPL-MCNC: 88 MG/DL (ref 65–100)
HCT VFR BLD AUTO: 39.6 % (ref 35.8–46.3)
HGB BLD-MCNC: 12.9 G/DL (ref 11.7–15.4)
MCH RBC QN AUTO: 29.5 PG (ref 26.1–32.9)
MCHC RBC AUTO-ENTMCNC: 32.6 G/DL (ref 31.4–35)
MCV RBC AUTO: 90.4 FL (ref 79.6–97.8)
NRBC # BLD: 0 K/UL (ref 0–0.2)
PLATELET # BLD AUTO: 236 K/UL (ref 150–450)
PMV BLD AUTO: 9.8 FL (ref 9.4–12.3)
POTASSIUM SERPL-SCNC: 3.9 MMOL/L (ref 3.5–5.1)
RBC # BLD AUTO: 4.38 M/UL (ref 4.05–5.2)
SODIUM SERPL-SCNC: 139 MMOL/L (ref 136–145)
WBC # BLD AUTO: 6.9 K/UL (ref 4.3–11.1)

## 2021-02-15 PROCEDURE — 85027 COMPLETE CBC AUTOMATED: CPT

## 2021-02-15 PROCEDURE — 74011250636 HC RX REV CODE- 250/636: Performed by: HOSPITALIST

## 2021-02-15 PROCEDURE — 74011250636 HC RX REV CODE- 250/636: Performed by: INTERNAL MEDICINE

## 2021-02-15 PROCEDURE — 74011250637 HC RX REV CODE- 250/637: Performed by: HOSPITALIST

## 2021-02-15 PROCEDURE — 80048 BASIC METABOLIC PNL TOTAL CA: CPT

## 2021-02-15 PROCEDURE — 74011250637 HC RX REV CODE- 250/637: Performed by: INTERNAL MEDICINE

## 2021-02-15 PROCEDURE — 36415 COLL VENOUS BLD VENIPUNCTURE: CPT

## 2021-02-15 RX ADMIN — NYSTATIN: 100000 POWDER TOPICAL at 07:48

## 2021-02-15 RX ADMIN — AMOXICILLIN 1000 MG: 500 CAPSULE ORAL at 04:17

## 2021-02-15 RX ADMIN — PROMETHAZINE HYDROCHLORIDE 12.5 MG: 25 TABLET ORAL at 00:18

## 2021-02-15 RX ADMIN — ACETAMINOPHEN 650 MG: 325 TABLET ORAL at 00:18

## 2021-02-15 RX ADMIN — TRAMADOL HYDROCHLORIDE 50 MG: 50 TABLET, FILM COATED ORAL at 07:53

## 2021-02-15 RX ADMIN — Medication 10 ML: at 14:16

## 2021-02-15 RX ADMIN — SILVER SULFADIAZINE: 10 CREAM TOPICAL at 09:00

## 2021-02-15 RX ADMIN — FUROSEMIDE 40 MG: 10 INJECTION, SOLUTION INTRAMUSCULAR; INTRAVENOUS at 07:48

## 2021-02-15 RX ADMIN — Medication 10 ML: at 04:48

## 2021-02-15 RX ADMIN — ACETAMINOPHEN 650 MG: 325 TABLET ORAL at 10:13

## 2021-02-15 RX ADMIN — AMOXICILLIN 1000 MG: 500 CAPSULE ORAL at 14:15

## 2021-02-15 RX ADMIN — ENOXAPARIN SODIUM 40 MG: 100 INJECTION SUBCUTANEOUS at 07:48

## 2021-02-15 RX ADMIN — HYDROMORPHONE HYDROCHLORIDE 0.5 MG: 1 INJECTION, SOLUTION INTRAMUSCULAR; INTRAVENOUS; SUBCUTANEOUS at 00:19

## 2021-02-15 NOTE — PROGRESS NOTES
Pt to d/c today. Pt has been able to contact ex-in-laws and can stay with them for now at [address] [facility] [address]    Pt will require assistance with medication. Faxed voucher to [facility] on [address].      Met with pt at bedside. Reviewed all resources previously provided from [employer], [employer], and [employer]. Pt aware she can get medications prescribed at [facility] on [address] with voucher CM faxed to [facility]. Pt aware no narcotics can be covered by hospital. [employer] coupon given. Pt also given application for assistance with hospital bill.      Attempted to call [address] again and left message. Pt plans to  belongings there at d/c.      Transport to be provided, [facility]15, to [facility], [place] to get belongings, and home of ex-in-laws. Primary RN aware. CM available if any new needs arise. Update: Roundtrip still has not arrived. Attempted to contact but only email available. Pt found own ride via daughter. Daughter on way now. Care Management Interventions  PCP Verified by CM: No(Information given for [facility])  Mode of Transport at Discharge: Other (see comment)(Round Trip )  Transition of Care Consult (CM Consult): Discharge [address] [facility] [address]: Yes  Discharge Durable Medical Equipment: No  Physical Therapy Consult: Yes  Occupational Therapy Consult: Yes  Speech Therapy Consult: No  Current Support Network:  Other(Moving in with ex-in laws)  Confirm Follow Up Transport: Self  The Plan for Transition of Care is Related to the Following Treatment Goals : Continue to be drug free and establish PCP  The Patient and/or Patient Representative was Provided with a Choice of Provider and Agrees with the Discharge Plan?: Yes  Name of the Patient Representative Who was Provided with a Choice of Provider and Agrees with the Discharge Plan: pt   Freedom of Choice List was Provided with Basic Dialogue that Supports the Patient's Individualized Plan of Care/Goals, Treatment Preferences and Shares the Quality Data Associated with the Providers?: Yes  [place] Resource Information Provided?: No  Discharge Location  Discharge Placement: Home(with in-law, HH once PCP )

## 2021-02-15 NOTE — DISCHARGE SUMMARY
Hospitalist Discharge Summary     Admit Date:  2021  1:52 PM   Name:  Keegan Olivera   Age:  55 y.o.  :  1975   MRN:  [id number]   PCP:  None  Treatment Team: Attending Provider: José Miguel Cohen MD; Care Manager: Destiney Kennedy, RN; Utilization Review: Josiah Elias, MARISSA; Staff Nurse: Maurilio Workman, RN; Staff Nurse: Renita Ridley RN; Occupational Therapist: Isabella Hong; Physical Therapist: Emily Law PT    Problem List for this Hospitalization:  Hospital Problems as of 2/15/2021 Date Reviewed: 2021          Codes Class Noted - Resolved POA    Class 3 severe obesity with body mass index (BMI) of 45.0 to 49.9 in adult [facility]) ICD-10-CM: E66.01, Z68.42  ICD-9-CM: 278.01, V85.42  2/15/2021 - Present Unknown        Chronic hepatitis C without hepatic coma ([facility] [address] [address] ICD-10-CM: B18.2  ICD-9-CM: 070.54  2021 - Present Unknown        Skin abrasion ICD-10-CM: T14. 8XXA  ICD-9-CM: 919.0  2021 - Present Yes        Skin lesion due to intravenous drug abuse [facility]) ICD-10-CM: L98.9, F19.188  ICD-9-CM: 709.8, 305.90  2021 - Present Unknown        Bacteremia due to Streptococcus ICD-10-CM: R78.81, B95.5  ICD-9-CM: 790.7, 041.00  2/10/2021 - Present Yes        Acute metabolic encephalopathy O[facility]-93-YD: G93.41  ICD-9-CM: 348.31  2021 - Present Yes        Hepatitis C antibody test positive ICD-10-CM: R76.8  ICD-9-CM: 795.79  2021 - Present         Amphetamine use disorder, mild (HCC) ICD-10-CM: F15.10  ICD-9-CM: 292.9, 305.70  2021 - Present Yes        RESOLVED: Cellulitis ICD-10-CM: L03.90  ICD-9-CM: 682.9  2021 - 2/15/2021 Yes        RESOLVED: Hypokalemia ICD-10-CM: E87.6  ICD-9-CM: 276.8  2021 - 2/15/2021 Yes        RESOLVED: Hyponatremia ICD-10-CM: E87.1  ICD-9-CM: 276.1  2021 - 2/15/2021 Yes        * (Principal) RESOLVED: Sepsis ([facility] ICD-10-CM: A41.9  ICD-9-CM: 038.9, 995.91  2021 - 2/15/2021 Yes                 Hospital Course :  Please refer to the admission H&P for details of presentation. In summary, Melissa Queen is a 55 y.o. female with past medical history significant for polysubstance abuse(methamphetamine, cannabis) who presented via EMS wanting to detox from methamphetamine. She was noted to have multiple skin wounds all over her body in various stages. Noted to have temp of 101.5F, tachycardia of 115, Na 129, K 2.7, elevated LFTs, e;evated CRP. UA noted to be 3+ bacteria with 10-20 WBCs. Patient was started on broad spectrum antibiotics. BCx grew Strep Pyogenes and antibiotics were de-escalated. Infectious disease is on board to [facility] with antibiotics regimen. TTE without vegetations. Improvement in her cellulitis and skin lesions without any fever or worsening signs of infections. Repeat BCx has been neg and her antibiotics were transitioned to oral abx by ID. Right arm wound with small abscess but not a candidate for drainage due to location/depth. CM/SW has been working with the patient to help with Rx, transportation as well as shelter placement. Patient is medically stable for discharge. Patient is to continue taking medications as prescribed and to follow up with PCP on discharge. Patient is instructed to return to ED if symptoms worsened. Disposition: shelter  Activity: Activity as tolerated  Diet: DIET REGULAR  Code Status: Full Code      Follow up instructions, discharge meds at bottom of this note. Plan was discussed with patient/family. All questions answered. Patient was stable at time of discharge. Patient will call a physician or return if any concerns. Diagnostic Imaging/Tests:   Ct Head Wo Cont    Result Date: 2/8/2021  CT of the head without contrast. CLINICAL INDICATION: Altered mental status PROCEDURE: Serial thin section axial images are obtained from the cranial vertex through the skull base without the administration of intravenous contrast. Radiation dose reduction techniques were used for this study.  Our CT scanners use one or all of the following: Automated exposure control, adjusted of the mA and/or kV according to patient size, iterative reconstruction COMPARISON: Head CT dated 2016. FINDINGS: Images are degraded by motion. There is no acute intracranial hemorrhage, mass, or mass effect. No abnormal extra-axial fluid collections identified. There is no hydrocephalus. The basilar cisterns are widely patent. The gray-white matter brain parenchymal interface is well-maintained. No skull fracture or aggressive osseous lesion noted. The mastoid air cells and included paranasal sinuses are clear. Examination is limited by motion degradation. No gross acute intracranial abnormality. [address] [place] [address]    Result Date: 2021  EXAM: Limited abdomen ultrasound. INDICATION: Elevated liver enzymes. COMPARISON: None. TECHNIQUE: Standard protocol limited right upper quadrant abdomen ultrasound. FINDINGS: - Liver: Within normal limits. - Gallbladder: Post cholecystectomy. - Bile ducts: Within normal limits. The common bile duct measures 5 mm. - Pancreas: Within normal limits, although the tail was obscured by bowel gas. - Right kidney: Within normal limits. - Aorta and IVC: Within normal limits. - Portal vein: Within normal limits. - Other: No ascites. Unremarkable study in this postcholecystectomy patient.       Echocardiogram results:  Results for orders placed or performed during the hospital encounter of 21   2D ECHO COMPLETE ADULT (TTE) W OR WO CONTR    Narrative    [facility] [address] [facility] [address] [facility]  [phone]    Transthoracic Echocardiogram  2D, M-mode, Doppler, and Color Doppler    Patient: Rosa M Preston  MR #: [id number]  : 1975  Age: 55 years  Gender: Female  Study date: 2021  Account #: [de-identified]  Height: 65 in  Weight: 219.6 lb  BSA: 2.06 mï¾²  Status:Routine  Location: [facility]  BP: 104/ 63    Allergies: MIREILLE    Sonographer:  Norma Hall  Group:  [facility]  Referring Physician:  Dom Wang. Ansley Mckoy MD  Reading Physician:  Pablito Gomez MD [facility]    INDICATIONS: Bacteremia. PROCEDURE: This was a routine study. A transthoracic echocardiogram was  performed. The study included complete 2D imaging, M-mode, complete spectral  Doppler, and color Doppler. Intravenous contrast (Definity) was administered. This was a technically difficult study. LEFT VENTRICLE: Size was normal. Systolic function was normal. Ejection  fraction was estimated in the range of 50 % to 55 %. There were no regional  wall motion abnormalities. Wall thickness was normal. Avg. E/e'= 12.98. There  is normal left ventricular diastolic function. RIGHT VENTRICLE: The size was normal. Systolic function was normal. The  tricuspid jet envelope definition was inadequate for estimation of RV   systolic  pressure. LEFT ATRIUM: The atrium was mildly dilated. RIGHT ATRIUM: Size was normal.    SYSTEMIC VEINS: IVC: The inferior vena cava was dilated. The respirophasic  change in diameter was less than 50%. AORTIC VALVE: The valve was probably trileaflet. There was no evidence for  vegetation. There was no evidence for stenosis. There was no insufficiency. MITRAL VALVE: Valve structure was normal. There was no evidence for   vegetation. There was no evidence for stenosis. There was mild regurgitation. TRICUSPID VALVE: The valve structure was normal. Although there was no  diagnostic evidence for vegetation, this study is not adequate to completely  exclude the possibility. There was no evidence for stenosis. There was mild  regurgitation. PULMONIC VALVE: The valve structure was normal. Although there was no  diagnostic evidence for vegetation, this study is not adequate to completely  exclude the possibility. There was no evidence for stenosis. There was no  insufficiency.     PERICARDIUM: There was no pericardial effusion. AORTA: The root exhibited normal size. SUMMARY:    -  Left ventricle: Systolic function was normal. Ejection fraction was  estimated in the range of 50 % to 55 %. There were no regional wall motion  abnormalities. -  Left atrium: The atrium was mildly dilated. -  Inferior vena cava, hepatic veins: The inferior vena cava was dilated. The  respirophasic change in diameter was less than 50%. -  Aortic valve: There was no evidence for vegetation.    -  Mitral valve: There was mild regurgitation. There was no evidence for  vegetation.    -  Tricuspid valve: There was mild regurgitation. Although there was no  diagnostic evidence for vegetation, this study is not adequate to completely  exclude the possibility. SYSTEM MEASUREMENT TABLES    2D mode  AoR Diam (2D): 3.3 cm  LA Dimension (2D): 4.6 cm  Left Atrium Systolic Volume Index; Method of Disks, Biplane; 2D mode;: 39.1  ml/m2  IVS/LVPW (2D): 1.1  IVSd (2D): 1 cm  LVIDd (2D): 5.2 cm  LVIDs (2D): 3.4 cm  LVPWd (2D): 0.9 cm  RVIDd (2D): 3.7 cm    Tissue Doppler Imaging  LV Peak Early Wesley Tissue Wero; Lateral MA (TDI): 16.2 cm/s  LV Peak Early Wesley Tissue Wero; Medial MA (TDI): 9.8 cm/s    Unspecified Scan Mode  Peak Grad; Mean; Antegrade Flow: 8 mm[Hg]  Vmax;  Antegrade Flow: 144 cm/s  MV Peak Wero/LV Peak Tissue Wero E-Wave; Lateral MA: 5.6  MV Peak Wero/LV Peak Tissue Wero E-Wave; Medial MA: 9.3    Prepared and signed by    Arnoldo Hernandez MD [facility]  Signed 11-Feb-2021 16:24:59         Procedures done this admission:  * No surgery found *    All Micro Results     Procedure Component Value Units Date/Time    CULTURE, BLOOD [[id number]] Collected: 02/12/21 1016    Order Status: Completed Specimen: Blood Updated: 02/14/21 0633     Special Requests: --        LEFT  Antecubital       Culture result: NO GROWTH 2 DAYS       CULTURE, BLOOD [[id number]] Collected: 02/12/21 1023    Order Status: Completed Specimen: Blood Updated: 02/14/21 3006     Special Requests: --        RIGHT  HAND       Culture result: NO GROWTH 2 DAYS       CULTURE, BLOOD [[id number]]  (Abnormal)  (Susceptibility) Collected: 02/08/21 1252    Order Status: Completed Specimen: Blood Updated: 02/11/21 0702     Special Requests: --        LEFT  HAND       GRAM STAIN GRAM POS COCCI IN CHAINS         ANAEROBIC BOTTLE POSITIVE               RESULTS VERIFIED, PHONED TO AND READ BACK BY Lawrence Carmona RN @8114 ON [id number] ZF           Culture result:       STREPTOCOCCUS PYOGENES SERO GROUP A Reported to Dept of Health                  Refer to Blood Culture ID Panel Accession  [id number]      BLOOD CULTURE ID PANEL [[id number]]  (Abnormal) Collected: 02/08/21 1252    Order Status: Completed Specimen: Blood Updated: 02/09/21 0648     Acc. no. from Micro Order [id number]     Streptococcus Detected        Streptococcus pyogenes (Group A) Detected        Comment: RESULTS VERIFIED, PHONED TO AND READ BACK BY  Lawrence Carmona RN @7628 ON [id number]          INTERPRETATION       Gram Positive cocci, Identified by realtime PCR as  Streptococcus pyogenes (Group A Strep)          CULTURE, BLOOD [[id number]]     Order Status: Canceled Specimen: Blood           Labs: Results:       BMP, Mg, Phos Recent Labs     02/15/21  0530 02/14/21  0515 02/13/21  0647    140 141   K 3.9 4.4 4.6    108* 110*   CO2 29 27 27   AGAP 3* 5* 4*   BUN 13 8 7   CREA 0.58* 0.51* 0.53*   CA 9.0 9.2 9.1   GLU 88 90 89      CBC Recent Labs     02/15/21  0530 02/14/21  0515 02/13/21  0647   WBC 6.9 5.6 5.5   RBC 4.38 4.19 4.20   HGB 12.9 12.3 12.3   HCT 39.6 38.2 38.9    238 263      LFT No results for input(s): ALT, TBIL, AP, TP, ALB, GLOB, AGRAT in the last 72 hours.     No lab exists for component: SGOT, GPT   Cardiac Testing Lab Results   Component Value Date/Time     09/29/2008 07:25 PM    Troponin-I, Qt. <0.04 (L) 09/29/2008 07:05 PM      Coagulation Tests No results found for: PTP, INR, APTT, INREXT, INREXT   A1c No results found for: HBA1C, HGBE8, MYF2OXPD, YMJ2KGNN   Lipid Panel No results found for: CHOL, CHOLPOCT, CHOLX, CHLST, CHOLV, 640238, HDL, HDLP, LDL, LDLC, DLDLP, 378556, VLDLC, VLDL, TGLX, TRIGL, TRIGP, TGLPOCT, CHHD, CHHDX   Thyroid Panel No results found for: TSH, T4, FT4, TT3, T3U, TSHEXT, TSHEXT     Most Recent UA Lab Results   Component Value Date/Time    Color ARMANI 02/07/2021 07:23 AM    Appearance CLOUDY 02/07/2021 07:23 AM    Specific gravity 1.027 (H) 02/07/2021 07:23 AM    pH (UA) 6.0 02/07/2021 07:23 AM    Protein 30 (A) 02/07/2021 07:23 AM    Glucose Negative 02/07/2021 07:23 AM    Ketone TRACE (A) 02/07/2021 07:23 AM    Bilirubin SMALL (A) 02/07/2021 07:23 AM    Blood MODERATE (A) 02/07/2021 07:23 AM    Urobilinogen 1.0 02/07/2021 07:23 AM    Nitrites Negative 02/07/2021 07:23 AM    Leukocyte Esterase MODERATE (A) 02/07/2021 07:23 AM    WBC 10-20 02/07/2021 07:23 AM    RBC 0-3 02/07/2021 07:23 AM    Epithelial cells 5-10 02/07/2021 07:23 AM    Bacteria 3+ (H) 02/07/2021 07:23 AM    Casts HYALINE 02/07/2021 07:23 AM    Crystals, urine 0 09/25/2008 10:00 AM    Mucus 2+ (H) 02/07/2021 07:23 AM    Other observations RESULTS VERIFIED MANUALLY 02/07/2021 07:23 AM        Allergies   Allergen Reactions    Erythromycin Shortness of Breath     Immunization History   Administered Date(s) Administered    TB Skin Test (PPD) Intradermal 02/09/2021    Tdap 07/14/2016       All Labs from Last 24 Hrs:  Recent Results (from the past 24 hour(s))   CBC W/O DIFF    Collection Time: 02/15/21  5:30 AM   Result Value Ref Range    WBC 6.9 4.3 - 11.1 K/uL    RBC 4.38 4.05 - 5.2 M/uL    HGB 12.9 11.7 - 15.4 g/dL    HCT 39.6 35.8 - 46.3 %    MCV 90.4 79.6 - 97.8 FL    MCH 29.5 26.1 - 32.9 PG    MCHC 32.6 31.4 - 35.0 g/dL    RDW 13.2 11.9 - 14.6 %    PLATELET 775 004 - 566 K/uL    MPV 9.8 9.4 - 12.3 FL    ABSOLUTE NRBC 0.00 0.0 - 0.2 K/uL   METABOLIC PANEL, BASIC    Collection Time: 02/15/21  5:30 AM   Result Value Ref Range Sodium 139 136 - 145 mmol/L    Potassium 3.9 3.5 - 5.1 mmol/L    Chloride 107 98 - 107 mmol/L    CO2 29 21 - 32 mmol/L    Anion gap 3 (L) 7 - 16 mmol/L    Glucose 88 65 - 100 mg/dL    BUN 13 6 - 23 MG/DL    Creatinine 0.58 (L) 0.6 - 1.0 MG/DL    GFR est AA >60 >60 ml/min/1.73m2    GFR est non-AA >60 >60 ml/min/1.73m2    Calcium 9.0 8.3 - 10.4 MG/DL       Current Med List in Hospital:   Current Facility-Administered Medications   Medication Dose Route Frequency    amoxicillin (AMOXIL) capsule 1,000 mg  1,000 mg Oral Q8H    silver sulfADIAZINE (SILVADENE) 1 % topical cream   Topical BID    furosemide (LASIX) injection 40 mg  40 mg IntraVENous DAILY    traMADoL (ULTRAM) tablet 50 mg  50 mg Oral Q6H PRN    HYDROmorphone (PF) (DILAUDID) injection 0.5 mg  0.5 mg IntraVENous Q6H PRN    nystatin (MYCOSTATIN) 100,000 unit/gram powder   Topical TID    sodium chloride (NS) flush 5-40 mL  5-40 mL IntraVENous Q8H    sodium chloride (NS) flush 5-40 mL  5-40 mL IntraVENous PRN    acetaminophen (TYLENOL) tablet 650 mg  650 mg Oral Q6H PRN    Or    acetaminophen (TYLENOL) suppository 650 mg  650 mg Rectal Q6H PRN    polyethylene glycol (MIRALAX) packet 17 g  17 g Oral DAILY PRN    promethazine (PHENERGAN) tablet 12.5 mg  12.5 mg Oral Q6H PRN    Or    ondansetron (ZOFRAN) injection 4 mg  4 mg IntraVENous Q6H PRN    enoxaparin (LOVENOX) injection 40 mg  40 mg SubCUTAneous DAILY    zolpidem (AMBIEN) tablet 5 mg  5 mg Oral QHS PRN       Discharge Exam:  Patient Vitals for the past 24 hrs:   Temp Pulse Resp BP SpO2   02/15/21 0818 97.6 °F (36.4 °C) 60 12 104/65 95 %   02/15/21 0359 97.8 °F (36.6 °C) 68 20 104/61 96 %   02/14/21 2335 97.8 °F (36.6 °C) 85 20 138/72 97 %   02/14/21 1937 98.1 °F (36.7 °C) 68 20 126/68 97 %   02/14/21 1701 98.1 °F (36.7 °C) 78 23 111/71 95 %   02/14/21 1251 98.7 °F (37.1 °C) 76 18 (!) 141/72 98 %     Oxygen Therapy  O2 Sat (%): 95 % (02/15/21 0818)  Pulse via Oximetry: 62 beats per minute (02/13/21 2000)  O2 Device: Room air (02/14/21 1930)    Estimated body mass index is 45.5 kg/m² as calculated from the following:    Height as of this encounter: 5' 5\" (1.651 m). Weight as of this encounter: 124 kg (273 lb 6.4 oz). No intake or output data in the 24 hours ending 02/15/21 1062    *Note that automatically entered I/Os may not be accurate; dependent on patient compliance with collection and accurate  by assistants. Physical Exam:   General:                     alert, awake, no acute distress. Appears tired  Head:                          normocephalic, atraumatic  Eyes, Ears, nose:      PERRL, EOMI. Normal conjunctiva  Neck:                          supple, non-tender. Trachea midline. Lungs:                        CTAB, no wheezing, rhonchi, rales  Cardiac:                      RRR, Normal S1 and S2. Abdomen:                  Soft, non distended, nontender, +BS,  Extremities:               Warm, dry. no erythema. Skin:                           No rashes, no jaundice, multiple skin wounds through out and all appeared crusted. One lesion in right forearm appears to have tracking into the subQ. Neuro:             AAOx3. No gross focal neurological deficit  Psychiatric:                No anxiety, calm, cooperative        Discharge Info:   Current Discharge Medication List      START taking these medications    Details   amoxicillin (AMOXIL) 500 mg capsule Take 2 Caps by mouth every eight (8) hours for 14 days. Qty: 84 Cap, Refills: 0      nystatin (MYCOSTATIN) powder Apply  to affected area three (3) times daily. Apply to reddened areas: abdominal folds, under breasts, between upper buttocks  Qty: 30 g, Refills: 0      silver sulfADIAZINE (SILVADENE) 1 % topical cream Apply  to affected area two (2) times a day.   Qty: 50 g, Refills: 0         CONTINUE these medications which have CHANGED    Details   traMADoL (ULTRAM) 50 mg tablet Take 1 Tab by mouth every six (6) hours as needed for Pain for up to 3 days. Max Daily Amount: 200 mg. Qty: 12 Tab, Refills: 0    Associated Diagnoses: Cellulitis of other specified site; Skin abrasion         CONTINUE these medications which have NOT CHANGED    Details   carisoprodol (SOMA) 350 mg tablet Take 1 Tab by mouth every eight (8) hours as needed for Muscle Spasm(s). Max Daily Amount: 1,050 mg.  Qty: 15 Tab, Refills: 0         STOP taking these medications       HYDROcodone-acetaminophen (NORCO) 7.5-325 mg per tablet Comments:   Reason for Stopping: Follow Up Orders: Follow-up Appointments   Procedures    FOLLOW UP VISIT Appointment in: [address] [place] [address] [address] PCP     [employer] for [facility] treatment  PCP     Standing Status:   Standing     Number of Occurrences:   1     Order Specific Question:   Appointment in     Answer: Two Weeks       Follow-up Information     Follow up With Specialties Details Why [address]    [address] [place] [address]  [phone]    None    None ([phone]) Patient stated that they have no PCP            Time spent in patient discharge planning and coordination 40 minutes.     Signed:  Willa Randhawa MD

## 2021-02-15 NOTE — PROGRESS NOTES
This CM called multiple resource to secure pt bed in homeless shelter. [facility] has no beds. [facility] offered 4 resource numbers and none with any availability for pt situation. [facility] reports available beds today. Requested pt call at 1430 to do interview as this is time CM is available to assign beds. Will have pt remain at hospital till 1430 to call [facility]. [facility] will [facility] available. Pt aware to call and CM will meet pt at bedside at 1430 to assure call made. CM to continue to follow and monitor for any further needs.

## 2021-02-15 NOTE — PROGRESS NOTES
PT discharged. Went over paperwork. PT taken to lobby via wheelchair. Ride was not here yet. PT asked just sit on bench and wait for ride. PT left with 2 bags and reports not having a cell phone.

## 2021-02-18 LAB
BACTERIA SPEC CULT: NORMAL
BACTERIA SPEC CULT: NORMAL
SERVICE CMNT-IMP: NORMAL
SERVICE CMNT-IMP: NORMAL

## 2022-03-18 PROBLEM — F15.10 AMPHETAMINE USE DISORDER, MILD (HCC): Status: ACTIVE | Noted: 2021-02-08

## 2022-03-19 PROBLEM — R76.8 HEPATITIS C ANTIBODY TEST POSITIVE: Status: ACTIVE | Noted: 2021-02-09

## 2022-03-19 PROBLEM — L98.9 SKIN LESION DUE TO INTRAVENOUS DRUG ABUSE (HCC): Status: ACTIVE | Noted: 2021-02-11

## 2022-03-19 PROBLEM — R78.81 BACTEREMIA DUE TO STREPTOCOCCUS: Status: ACTIVE | Noted: 2021-02-10

## 2022-03-19 PROBLEM — B18.2 CHRONIC HEPATITIS C WITHOUT HEPATIC COMA (HCC): Status: ACTIVE | Noted: 2021-02-13

## 2022-03-19 PROBLEM — F19.188 SKIN LESION DUE TO INTRAVENOUS DRUG ABUSE (HCC): Status: ACTIVE | Noted: 2021-02-11

## 2022-03-19 PROBLEM — E66.01 CLASS 3 SEVERE OBESITY WITH BODY MASS INDEX (BMI) OF 45.0 TO 49.9 IN ADULT (HCC): Status: ACTIVE | Noted: 2021-02-15

## 2022-03-19 PROBLEM — T14.8XXA SKIN ABRASION: Status: ACTIVE | Noted: 2021-02-11

## 2022-03-19 PROBLEM — B95.5 BACTEREMIA DUE TO STREPTOCOCCUS: Status: ACTIVE | Noted: 2021-02-10

## 2022-03-19 PROBLEM — G93.41 ACUTE METABOLIC ENCEPHALOPATHY: Status: ACTIVE | Noted: 2021-02-09

## 2024-07-29 ENCOUNTER — APPOINTMENT (OUTPATIENT)
Dept: GENERAL RADIOLOGY | Age: 49
End: 2024-07-29

## 2024-07-29 ENCOUNTER — HOSPITAL ENCOUNTER (EMERGENCY)
Age: 49
Discharge: HOME | End: 2024-07-29
Attending: EMERGENCY MEDICINE

## 2024-07-29 VITALS
OXYGEN SATURATION: 97 % | WEIGHT: 185 LBS | RESPIRATION RATE: 20 BRPM | HEIGHT: 66 IN | DIASTOLIC BLOOD PRESSURE: 98 MMHG | SYSTOLIC BLOOD PRESSURE: 121 MMHG | HEART RATE: 96 BPM | BODY MASS INDEX: 29.73 KG/M2 | TEMPERATURE: 98 F

## 2024-07-29 DIAGNOSIS — N39.0 URINARY TRACT INFECTION WITHOUT HEMATURIA, SITE UNSPECIFIED: ICD-10-CM

## 2024-07-29 DIAGNOSIS — F32.A DEPRESSION, UNSPECIFIED DEPRESSION TYPE: Primary | ICD-10-CM

## 2024-07-29 LAB
ALBUMIN SERPL-MCNC: 3.8 G/DL (ref 3.5–5)
ALBUMIN/GLOB SERPL: 1 (ref 1–1.9)
ALP SERPL-CCNC: 101 U/L (ref 35–104)
ALT SERPL-CCNC: 57 U/L (ref 12–65)
ANION GAP SERPL CALC-SCNC: 10 MMOL/L (ref 9–18)
AST SERPL-CCNC: 49 U/L (ref 15–37)
BACTERIA URNS QL MICRO: ABNORMAL /HPF
BASOPHILS # BLD: 0.1 K/UL (ref 0–0.2)
BASOPHILS NFR BLD: 1 % (ref 0–2)
BILIRUB SERPL-MCNC: 0.5 MG/DL (ref 0–1.2)
BILIRUB UR QL: NEGATIVE
BUN SERPL-MCNC: 9 MG/DL (ref 6–23)
CALCIUM SERPL-MCNC: 9.3 MG/DL (ref 8.8–10.2)
CHLORIDE SERPL-SCNC: 101 MMOL/L (ref 98–107)
CO2 SERPL-SCNC: 24 MMOL/L (ref 20–28)
CREAT SERPL-MCNC: 0.68 MG/DL (ref 0.6–1.1)
DIFFERENTIAL METHOD BLD: NORMAL
EOSINOPHIL # BLD: 0.2 K/UL (ref 0–0.8)
EOSINOPHIL NFR BLD: 3 % (ref 0.5–7.8)
EPI CELLS #/AREA URNS HPF: ABNORMAL /HPF
ERYTHROCYTE [DISTWIDTH] IN BLOOD BY AUTOMATED COUNT: 14 % (ref 11.9–14.6)
GLOBULIN SER CALC-MCNC: 3.8 G/DL (ref 2.3–3.5)
GLUCOSE SERPL-MCNC: 100 MG/DL (ref 70–99)
GLUCOSE UR QL STRIP.AUTO: NEGATIVE MG/DL
HCG UR QL: NEGATIVE
HCG, URINE, POC: NEGATIVE
HCT VFR BLD AUTO: 43.4 % (ref 35.8–46.3)
HGB BLD-MCNC: 14.1 G/DL (ref 11.7–15.4)
HYALINE CASTS URNS QL MICRO: ABNORMAL /LPF
IMM GRANULOCYTES # BLD AUTO: 0 K/UL (ref 0–0.5)
IMM GRANULOCYTES NFR BLD AUTO: 0 % (ref 0–5)
KETONES UR-MCNC: NEGATIVE MG/DL
LEUKOCYTE ESTERASE UR QL STRIP: ABNORMAL
LIPASE SERPL-CCNC: 15 U/L (ref 13–60)
LYMPHOCYTES # BLD: 1.7 K/UL (ref 0.5–4.6)
LYMPHOCYTES NFR BLD: 26 % (ref 13–44)
Lab: NORMAL
MCH RBC QN AUTO: 29.1 PG (ref 26.1–32.9)
MCHC RBC AUTO-ENTMCNC: 32.5 G/DL (ref 31.4–35)
MCV RBC AUTO: 89.7 FL (ref 82–102)
MONOCYTES # BLD: 0.5 K/UL (ref 0.1–1.3)
MONOCYTES NFR BLD: 7 % (ref 4–12)
NEGATIVE QC PASS/FAIL: NORMAL
NEUTS SEG # BLD: 4 K/UL (ref 1.7–8.2)
NEUTS SEG NFR BLD: 63 % (ref 43–78)
NITRITE UR QL: NEGATIVE
NRBC # BLD: 0 K/UL (ref 0–0.2)
PH UR: 5.5 (ref 5–9)
PLATELET # BLD AUTO: 241 K/UL (ref 150–450)
PMV BLD AUTO: 11.4 FL (ref 9.4–12.3)
POSITIVE QC PASS/FAIL: NORMAL
POTASSIUM SERPL-SCNC: 3.9 MMOL/L (ref 3.5–5.1)
PROT SERPL-MCNC: 7.6 G/DL (ref 6.3–8.2)
PROT UR QL: NEGATIVE MG/DL
RBC # BLD AUTO: 4.84 M/UL (ref 4.05–5.2)
RBC # UR STRIP: ABNORMAL
RBC #/AREA URNS HPF: ABNORMAL /HPF
SERVICE CMNT-IMP: ABNORMAL
SODIUM SERPL-SCNC: 135 MMOL/L (ref 136–145)
SP GR UR: 1.02 (ref 1–1.02)
TROPONIN T SERPL HS-MCNC: <6 NG/L (ref 0–14)
UROBILINOGEN UR QL: 0.2 EU/DL (ref 0.2–1)
WBC # BLD AUTO: 6.4 K/UL (ref 4.3–11.1)
WBC URNS QL MICRO: ABNORMAL /HPF

## 2024-07-29 PROCEDURE — 83690 ASSAY OF LIPASE: CPT

## 2024-07-29 PROCEDURE — 71045 X-RAY EXAM CHEST 1 VIEW: CPT

## 2024-07-29 PROCEDURE — 84484 ASSAY OF TROPONIN QUANT: CPT

## 2024-07-29 PROCEDURE — 2580000003 HC RX 258: Performed by: EMERGENCY MEDICINE

## 2024-07-29 PROCEDURE — 81001 URINALYSIS AUTO W/SCOPE: CPT

## 2024-07-29 PROCEDURE — 81025 URINE PREGNANCY TEST: CPT

## 2024-07-29 PROCEDURE — 99285 EMERGENCY DEPT VISIT HI MDM: CPT

## 2024-07-29 PROCEDURE — 80053 COMPREHEN METABOLIC PANEL: CPT

## 2024-07-29 PROCEDURE — 93005 ELECTROCARDIOGRAM TRACING: CPT | Performed by: EMERGENCY MEDICINE

## 2024-07-29 PROCEDURE — 85025 COMPLETE CBC W/AUTO DIFF WBC: CPT

## 2024-07-29 PROCEDURE — 6370000000 HC RX 637 (ALT 250 FOR IP): Performed by: EMERGENCY MEDICINE

## 2024-07-29 RX ORDER — VENLAFAXINE HYDROCHLORIDE 75 MG/1
75 CAPSULE, EXTENDED RELEASE ORAL DAILY
Qty: 60 CAPSULE | Refills: 0 | Status: SHIPPED | OUTPATIENT
Start: 2024-07-29

## 2024-07-29 RX ORDER — CEPHALEXIN 500 MG/1
500 CAPSULE ORAL 4 TIMES DAILY
Qty: 160 CAPSULE | Refills: 0 | Status: SHIPPED | OUTPATIENT
Start: 2024-07-29 | End: 2024-09-07

## 2024-07-29 RX ORDER — 0.9 % SODIUM CHLORIDE 0.9 %
1000 INTRAVENOUS SOLUTION INTRAVENOUS ONCE
Status: COMPLETED | OUTPATIENT
Start: 2024-07-29 | End: 2024-07-29

## 2024-07-29 RX ORDER — VENLAFAXINE HYDROCHLORIDE 75 MG/1
75 CAPSULE, EXTENDED RELEASE ORAL
Status: DISCONTINUED | OUTPATIENT
Start: 2024-07-29 | End: 2024-07-30 | Stop reason: HOSPADM

## 2024-07-29 RX ORDER — VENLAFAXINE HYDROCHLORIDE 75 MG/1
75 CAPSULE, EXTENDED RELEASE ORAL
Status: DISCONTINUED | OUTPATIENT
Start: 2024-07-30 | End: 2024-07-29

## 2024-07-29 RX ADMIN — VENLAFAXINE HYDROCHLORIDE 75 MG: 75 CAPSULE, EXTENDED RELEASE ORAL at 21:21

## 2024-07-29 RX ADMIN — SODIUM CHLORIDE 1000 ML: 9 INJECTION, SOLUTION INTRAVENOUS at 21:13

## 2024-07-29 ASSESSMENT — LIFESTYLE VARIABLES
HOW OFTEN DO YOU HAVE A DRINK CONTAINING ALCOHOL: NEVER
HOW OFTEN DO YOU HAVE A DRINK CONTAINING ALCOHOL: PATIENT DECLINED
HOW MANY STANDARD DRINKS CONTAINING ALCOHOL DO YOU HAVE ON A TYPICAL DAY: PATIENT DOES NOT DRINK
HOW MANY STANDARD DRINKS CONTAINING ALCOHOL DO YOU HAVE ON A TYPICAL DAY: PATIENT DECLINED

## 2024-07-29 ASSESSMENT — PAIN SCALES - GENERAL: PAINLEVEL_OUTOF10: 7

## 2024-07-29 ASSESSMENT — PAIN - FUNCTIONAL ASSESSMENT: PAIN_FUNCTIONAL_ASSESSMENT: 0-10

## 2024-07-29 NOTE — ED TRIAGE NOTES
Patient ambulatory to triage with CO bilateral hand tingling and pain intermittently x \"a long time\". Pt here with daughter. Concern for pt not taking Effexor and manic behavior.

## 2024-07-29 NOTE — ED PROVIDER NOTES
Emergency Department Provider Note       PCP: None, None   Age: 49 y.o.   Sex: female     DISPOSITION Decision To Discharge 07/29/2024 10:36:44 PM       ICD-10-CM    1. Depression, unspecified depression type  F32.A       2. Urinary tract infection without hematuria, site unspecified  N39.0           Medical Decision Making     Patient is a 49-year-old female with a history of amphetamine use, chronic bilateral hand tingling, chronic hep C who presents with daughter requesting medication refill of Effexor 75 mg daily.  Patient was recently released from [facility] 2 weeks ago in which she was taking Effexor with good results and patient since then has relapsed and is using methamphetamines again.  Patient denies any suicidal or homicidal ideation denies any auditory or visual examinations.  The daughter at bedside corroborates the patient is not suicidal or homicidal.    Differential diagnosis includes but is not limited to amphetamine use, anxiety, depression    Patient's physical exam is unremarkable patient is ANO x 4.  Patient has no physical complaints other than bilateral hand tingling that she has had for several years.  Patient's physical exam is unremarkable.  Laboratory testing unremarkable.  We will restart her Effexor 75 mg daily and patient has been instructed to not use drugs.  All questions answered.         1 or more acute illnesses that pose a threat to life or bodily function.   Shared medical decision making was utilized in creating the patients health plan today.    I independently ordered and reviewed each unique test.  I reviewed external records: provider visit note from PCP.     I interpreted the X-rays CXR NAD.  My Independent EKG Interpretation: sinus rhythm, no evidence of arrhythmia and no acute changes      ST Segments:Nonspecific ST segments - NO STEMI   Rate: 104            History     Patient is a 49-year-old female with a history of amphetamine use, chronic bilateral hand tingling,

## 2024-07-30 LAB
EKG ATRIAL RATE: 104 BPM
EKG DIAGNOSIS: NORMAL
EKG P AXIS: 34 DEGREES
EKG P-R INTERVAL: 158 MS
EKG Q-T INTERVAL: 356 MS
EKG QRS DURATION: 86 MS
EKG QTC CALCULATION (BAZETT): 468 MS
EKG R AXIS: 76 DEGREES
EKG T AXIS: 32 DEGREES
EKG VENTRICULAR RATE: 104 BPM

## 2024-07-30 PROCEDURE — 93010 ELECTROCARDIOGRAM REPORT: CPT | Performed by: INTERNAL MEDICINE

## 2024-07-30 NOTE — ED NOTES
Patient mobility status  with no difficulty. Provider aware     I have reviewed discharge instructions with the patient.  The patient verbalized understanding.    Patient left ED via Discharge Method: ambulatory to Home with family.    Opportunity for questions and clarification provided.     Patient given 2 scripts.            Sophy Arteaga LPN  07/29/24 [id number]